# Patient Record
Sex: MALE | Race: WHITE | NOT HISPANIC OR LATINO | URBAN - METROPOLITAN AREA
[De-identification: names, ages, dates, MRNs, and addresses within clinical notes are randomized per-mention and may not be internally consistent; named-entity substitution may affect disease eponyms.]

---

## 2018-10-21 ENCOUNTER — INPATIENT (INPATIENT)
Facility: HOSPITAL | Age: 23
LOS: 1 days | Discharge: HOME | End: 2018-10-23
Attending: INTERNAL MEDICINE | Admitting: INTERNAL MEDICINE
Payer: MEDICARE

## 2018-10-21 VITALS
WEIGHT: 138.01 LBS | RESPIRATION RATE: 18 BRPM | HEART RATE: 110 BPM | TEMPERATURE: 97 F | OXYGEN SATURATION: 100 % | DIASTOLIC BLOOD PRESSURE: 78 MMHG | HEIGHT: 60 IN | SYSTOLIC BLOOD PRESSURE: 105 MMHG

## 2018-10-21 LAB
ALBUMIN SERPL ELPH-MCNC: 2.8 G/DL — LOW (ref 3.3–5)
ALP SERPL-CCNC: 81 U/L — SIGNIFICANT CHANGE UP (ref 40–120)
ALT FLD-CCNC: 20 U/L — SIGNIFICANT CHANGE UP (ref 12–78)
ANION GAP SERPL CALC-SCNC: 7 MMOL/L — SIGNIFICANT CHANGE UP (ref 5–17)
AST SERPL-CCNC: 15 U/L — SIGNIFICANT CHANGE UP (ref 15–37)
BASOPHILS # BLD AUTO: 0.09 K/UL — SIGNIFICANT CHANGE UP (ref 0–0.2)
BASOPHILS NFR BLD AUTO: 1 % — SIGNIFICANT CHANGE UP (ref 0–2)
BILIRUB SERPL-MCNC: 0.4 MG/DL — SIGNIFICANT CHANGE UP (ref 0.2–1.2)
BLD GP AB SCN SERPL QL: SIGNIFICANT CHANGE UP
BUN SERPL-MCNC: 14 MG/DL — SIGNIFICANT CHANGE UP (ref 7–23)
CALCIUM SERPL-MCNC: 8.8 MG/DL — SIGNIFICANT CHANGE UP (ref 8.5–10.1)
CHLORIDE SERPL-SCNC: 103 MMOL/L — SIGNIFICANT CHANGE UP (ref 96–108)
CO2 SERPL-SCNC: 27 MMOL/L — SIGNIFICANT CHANGE UP (ref 22–31)
CREAT SERPL-MCNC: 1.03 MG/DL — SIGNIFICANT CHANGE UP (ref 0.5–1.3)
EOSINOPHIL # BLD AUTO: 0.11 K/UL — SIGNIFICANT CHANGE UP (ref 0–0.5)
EOSINOPHIL NFR BLD AUTO: 1.2 % — SIGNIFICANT CHANGE UP (ref 0–6)
GLUCOSE SERPL-MCNC: 93 MG/DL — SIGNIFICANT CHANGE UP (ref 70–99)
HCT VFR BLD CALC: 43.3 % — SIGNIFICANT CHANGE UP (ref 39–50)
HGB BLD-MCNC: 14.8 G/DL — SIGNIFICANT CHANGE UP (ref 13–17)
IMM GRANULOCYTES NFR BLD AUTO: 0.3 % — SIGNIFICANT CHANGE UP (ref 0–1.5)
INR BLD: 1.35 RATIO — HIGH (ref 0.88–1.16)
LACTATE SERPL-SCNC: 0.9 MMOL/L — SIGNIFICANT CHANGE UP (ref 0.7–2)
LYMPHOCYTES # BLD AUTO: 1.69 K/UL — SIGNIFICANT CHANGE UP (ref 1–3.3)
LYMPHOCYTES # BLD AUTO: 19 % — SIGNIFICANT CHANGE UP (ref 13–44)
MCHC RBC-ENTMCNC: 30.7 PG — SIGNIFICANT CHANGE UP (ref 27–34)
MCHC RBC-ENTMCNC: 34.2 GM/DL — SIGNIFICANT CHANGE UP (ref 32–36)
MCV RBC AUTO: 89.8 FL — SIGNIFICANT CHANGE UP (ref 80–100)
MONOCYTES # BLD AUTO: 0.54 K/UL — SIGNIFICANT CHANGE UP (ref 0–0.9)
MONOCYTES NFR BLD AUTO: 6.1 % — SIGNIFICANT CHANGE UP (ref 2–14)
NEUTROPHILS # BLD AUTO: 6.44 K/UL — SIGNIFICANT CHANGE UP (ref 1.8–7.4)
NEUTROPHILS NFR BLD AUTO: 72.4 % — SIGNIFICANT CHANGE UP (ref 43–77)
NRBC # BLD: 0 /100 WBCS — SIGNIFICANT CHANGE UP (ref 0–0)
PLATELET # BLD AUTO: 340 K/UL — SIGNIFICANT CHANGE UP (ref 150–400)
POTASSIUM SERPL-MCNC: 4.3 MMOL/L — SIGNIFICANT CHANGE UP (ref 3.5–5.3)
POTASSIUM SERPL-SCNC: 4.3 MMOL/L — SIGNIFICANT CHANGE UP (ref 3.5–5.3)
PROT SERPL-MCNC: 8.2 GM/DL — SIGNIFICANT CHANGE UP (ref 6–8.3)
PROTHROM AB SERPL-ACNC: 14.7 SEC — HIGH (ref 9.8–12.7)
RAPID RVP RESULT: DETECTED
RBC # BLD: 4.82 M/UL — SIGNIFICANT CHANGE UP (ref 4.2–5.8)
RBC # FLD: 13 % — SIGNIFICANT CHANGE UP (ref 10.3–14.5)
RV+EV RNA SPEC QL NAA+PROBE: DETECTED
SODIUM SERPL-SCNC: 137 MMOL/L — SIGNIFICANT CHANGE UP (ref 135–145)
TYPE + AB SCN PNL BLD: SIGNIFICANT CHANGE UP
WBC # BLD: 8.9 K/UL — SIGNIFICANT CHANGE UP (ref 3.8–10.5)
WBC # FLD AUTO: 8.9 K/UL — SIGNIFICANT CHANGE UP (ref 3.8–10.5)

## 2018-10-21 PROCEDURE — 74177 CT ABD & PELVIS W/CONTRAST: CPT | Mod: 26

## 2018-10-21 PROCEDURE — 99285 EMERGENCY DEPT VISIT HI MDM: CPT

## 2018-10-21 PROCEDURE — 93010 ELECTROCARDIOGRAM REPORT: CPT

## 2018-10-21 PROCEDURE — 71250 CT THORAX DX C-: CPT | Mod: 26

## 2018-10-21 RX ORDER — ACETAMINOPHEN 500 MG
650 TABLET ORAL EVERY 6 HOURS
Qty: 0 | Refills: 0 | Status: DISCONTINUED | OUTPATIENT
Start: 2018-10-21 | End: 2018-10-23

## 2018-10-21 RX ORDER — AMPICILLIN SODIUM AND SULBACTAM SODIUM 250; 125 MG/ML; MG/ML
3 INJECTION, POWDER, FOR SUSPENSION INTRAMUSCULAR; INTRAVENOUS EVERY 6 HOURS
Qty: 0 | Refills: 0 | Status: DISCONTINUED | OUTPATIENT
Start: 2018-10-22 | End: 2018-10-23

## 2018-10-21 RX ORDER — AMPICILLIN SODIUM AND SULBACTAM SODIUM 250; 125 MG/ML; MG/ML
3 INJECTION, POWDER, FOR SUSPENSION INTRAMUSCULAR; INTRAVENOUS ONCE
Qty: 0 | Refills: 0 | Status: COMPLETED | OUTPATIENT
Start: 2018-10-21 | End: 2018-10-21

## 2018-10-21 RX ORDER — SODIUM CHLORIDE 9 MG/ML
1000 INJECTION INTRAMUSCULAR; INTRAVENOUS; SUBCUTANEOUS
Qty: 0 | Refills: 0 | Status: COMPLETED | OUTPATIENT
Start: 2018-10-21 | End: 2018-10-21

## 2018-10-21 RX ORDER — SODIUM CHLORIDE 9 MG/ML
1000 INJECTION INTRAMUSCULAR; INTRAVENOUS; SUBCUTANEOUS ONCE
Qty: 0 | Refills: 0 | Status: COMPLETED | OUTPATIENT
Start: 2018-10-21 | End: 2018-10-21

## 2018-10-21 RX ADMIN — AMPICILLIN SODIUM AND SULBACTAM SODIUM 200 GRAM(S): 250; 125 INJECTION, POWDER, FOR SUSPENSION INTRAMUSCULAR; INTRAVENOUS at 19:32

## 2018-10-21 RX ADMIN — SODIUM CHLORIDE 1000 MILLILITER(S): 9 INJECTION INTRAMUSCULAR; INTRAVENOUS; SUBCUTANEOUS at 17:20

## 2018-10-21 RX ADMIN — SODIUM CHLORIDE 1000 MILLILITER(S): 9 INJECTION INTRAMUSCULAR; INTRAVENOUS; SUBCUTANEOUS at 16:20

## 2018-10-21 RX ADMIN — SODIUM CHLORIDE 75 MILLILITER(S): 9 INJECTION INTRAMUSCULAR; INTRAVENOUS; SUBCUTANEOUS at 22:20

## 2018-10-21 RX ADMIN — SODIUM CHLORIDE 1000 MILLILITER(S): 9 INJECTION INTRAMUSCULAR; INTRAVENOUS; SUBCUTANEOUS at 19:32

## 2018-10-21 NOTE — ED ADULT TRIAGE NOTE - CHIEF COMPLAINT QUOTE
Pt brought in by father; father states that pt may have swallowed plastic bottle cap one week ago, has had difficulty swallowing and vomiting x 1 week.  No respiratory distress noted.  Hx Down syndrome

## 2018-10-21 NOTE — H&P ADULT - NSHPPHYSICALEXAM_GEN_ALL_CORE
Vital Signs Last 24 Hrs  T(C): 36.9 (21 Oct 2018 19:35), Max: 36.9 (21 Oct 2018 19:35)  T(F): 98.4 (21 Oct 2018 19:35), Max: 98.4 (21 Oct 2018 19:35)  HR: 104 (21 Oct 2018 18:46) (104 - 110)  BP: 104/80 (21 Oct 2018 18:46) (104/80 - 105/78)  RR: 18 (21 Oct 2018 18:46) (18 - 18)  SpO2: 100% (21 Oct 2018 18:46) (100% - 100%)

## 2018-10-21 NOTE — H&P ADULT - ASSESSMENT
24 yo M with PMH significant for Down Syndrome BIB Father to the ED for eval. of Possible foreign body ingestion, decreased Po intake, N/V.     # LLL PNA, Lung abscess  - Afebrile Tachycardia, no lactic acidosis  - Admit to Med-Surg  - Pt received IV Unasyn in the ED, will c/w IV Unasyn  - PNA C+S  - Pulmonary consult  - Tylenol prn for fever  - Cough meds prn    # Down Syndrome  - Constant observation    # DVT Ppx  - Ambulation    IMPROVE VTE Individual Risk Assessment    RISK                                                                Points    [  ] Previous VTE                                                  3    [  ] Thrombophilia                                               2    [  ] Lower limb paralysis                                      2        (unable to hold up >15 seconds)      [  ] Current Cancer                                              2         (within 6 months)    [  ] Immobilization > 24 hrs                                1    [  ] ICU/CCU stay > 24 hours                              1    [  ] Age > 60                                                      1    IMPROVE VTE Score _____0____      Case d/w Dr. Gunderson 22 yo M with PMH significant for Down Syndrome BIB Father to the ED for eval. of Possible foreign body ingestion, decreased Po intake, N/V.     # LLL PNA, Lung abscess  - Afebrile Tachycardia, no lactic acidosis  - Admit to Med-Surg  - Pt received IV Unasyn in the ED, will c/w IV Unasyn  - PNA C+S  - Pulmonary consult  - Tylenol prn for fever  - Cough meds prn    # Down Syndrome  - Constant observation    # RVP panel: + Entero/Rhinovirus  - Supportive care    # DVT Ppx  - Ambulation    IMPROVE VTE Individual Risk Assessment    RISK                                                                Points    [  ] Previous VTE                                                  3    [  ] Thrombophilia                                               2    [  ] Lower limb paralysis                                      2        (unable to hold up >15 seconds)      [  ] Current Cancer                                              2         (within 6 months)    [  ] Immobilization > 24 hrs                                1    [  ] ICU/CCU stay > 24 hours                              1    [  ] Age > 60                                                      1    IMPROVE VTE Score _____0____      Case d/w Dr. Gunderson 24 yo M with PMH significant for Down Syndrome BIB Father to the ED for eval. of Possible foreign body ingestion, decreased Po intake, N/V.     # LLL PNA, Lung abscess  - Afebrile Tachycardia, no lactic acidosis  - Admit to Med-Surg  - Pt received IV Unasyn in the ED, will c/w IV Unasyn  - PAN C+S  - Pulmonary consult  - Tylenol prn for fever  - Cough meds prn    # Down Syndrome  - Constant observation    # RVP panel: + Entero/Rhinovirus  - Supportive care    # DVT Ppx  - Ambulation    IMPROVE VTE Individual Risk Assessment    RISK                                                                Points    [  ] Previous VTE                                                  3    [  ] Thrombophilia                                               2    [  ] Lower limb paralysis                                      2        (unable to hold up >15 seconds)      [  ] Current Cancer                                              2         (within 6 months)    [  ] Immobilization > 24 hrs                                1    [  ] ICU/CCU stay > 24 hours                              1    [  ] Age > 60                                                      1    IMPROVE VTE Score _____0____      Case d/w Dr. Gunderson 22 yo M with PMH significant for Down Syndrome BIB Father to the ED for eval. of Possible foreign body ingestion, decreased Po intake, N/V.     # LLL PNA, Lung abscess  - Afebrile Tachycardia, no lactic acidosis  - Admit to Med-Surg  - Pt received IV Unasyn in the ED, will c/w IV Unasyn  - PAN C+S  - Pulmonary consult  - Tylenol prn for fever  - Cough meds prn    # Down Syndrome  - Constant observation 1:1    # RVP panel: + Entero/Rhinovirus  - Supportive care    # DVT Ppx  - Ambulation    IMPROVE VTE Individual Risk Assessment    RISK                                                                Points    [  ] Previous VTE                                                  3    [  ] Thrombophilia                                               2    [  ] Lower limb paralysis                                      2        (unable to hold up >15 seconds)      [  ] Current Cancer                                              2         (within 6 months)    [  ] Immobilization > 24 hrs                                1    [  ] ICU/CCU stay > 24 hours                              1    [  ] Age > 60                                                      1    IMPROVE VTE Score _____0____      Case d/w Dr. Gunderson 22 yo M with PMH significant for Down Syndrome BIB Father to the ED for eval. of Possible foreign body ingestion, decreased Po intake, N/V.     # LLL PNA, Lung abscess  - Afebrile Tachycardia, no lactic acidosis  - Admit to Med-Surg  - Pt received IV Unasyn in the ED, will c/w IV Unasyn  - PAN C+S  - Pulmonary consult  - Tylenol prn for fever  - Cough meds prn    # Down Syndrome  - Constant observation 1:1    # RVP panel: + Entero/Rhinovirus  - Supportive care    # DVT Ppx  - SCD's    IMPROVE VTE Individual Risk Assessment    RISK                                                                Points    [  ] Previous VTE                                                  3    [  ] Thrombophilia                                               2    [  ] Lower limb paralysis                                      2        (unable to hold up >15 seconds)      [  ] Current Cancer                                              2         (within 6 months)    [  ] Immobilization > 24 hrs                                1    [  ] ICU/CCU stay > 24 hours                              1    [  ] Age > 60                                                      1    IMPROVE VTE Score _____0____      Case d/w Dr. Gunderson

## 2018-10-21 NOTE — ED STATDOCS - OBJECTIVE STATEMENT
22 y/o male with PMHx of down syndrome presents to the ED c/o questionable foreign body. +difficulty swallowing x1 week. Father reports 10lbs weight loss recently secondary to decreased PO intake. +N/V. Reports constipation, now resolved. Father suspects that pt swallowed water bottle cap within the past 2 weeks, which he has been known to chew on in the past. PCP/Dr. Schroeder, Dr. Nicole.

## 2018-10-21 NOTE — H&P ADULT - HISTORY OF PRESENT ILLNESS
22 yo M with PMH significant for Down Syndrome BIB Father to the ED for eval. of Possible foreign body ingestion, decreased Po intake, N/V. Pt's Father reported that Pt has not been tolerating any PO intake x 10 days, cough, N/V, + ? 10 b weight loss in the past 10 days.  Pt's father thought that pt might had chewed water bottle cap but on CTAP found to have LLL PNA and abscess. ED attending spoke to Dr. Porras and recommends to order Non contrast CT chest, IV Unasyn  and will eval. pt in AM. CT chest result pending. Denies fever, chills or diarrhea. Denies chest pain or SOB. NO PMH of severe infection, No recent hospitalizations, not on  home meds.     Pt is Afebrile no leucocytosis. BP: 104/80 mm hg, HR: 104.    Family h/o: 24 yo M with PMH significant for Down Syndrome BIB Father to the ED for eval. of Possible foreign body ingestion, decreased Po intake, N/V. Pt's Father reported that Pt has not been tolerating any PO intake x 10 days, cough, N/V, + ? 10 lb weight loss in the past 10 days.  Pt's father thought that pt might had chewed water bottle cap but on CTAP found to have LLL PNA and abscess. ED attending spoke to Dr. Porras and recommends to order Non contrast CT chest, IV Unasyn  and will eval. pt in AM. CT chest result pending. Denies fever, chills or diarrhea. Denies chest pain or SOB. NO PMH of severe infection, No recent hospitalizations, not on  home meds.     Pt is Afebrile no leucocytosis. BP: 104/80 mm hg, HR: 104.    Family h/o: 22 yo M with PMH significant for Down Syndrome BIB Father to the ED for eval. of Possible foreign body ingestion, decreased Po intake, N/V. Pt's Father reported that Pt has not been tolerating any PO intake x 10 days, cough, N/V, + ? 10 lb weight loss in the past 10 days.  Pt's father thought that pt might had chewed water bottle cap but on CTAP found to have LLL PNA and abscess. ED attending spoke to Dr. Porras and recommends to order Non contrast CT chest, IV Unasyn  and will eval. pt in AM. CT chest result pending. Denies fever, chills or diarrhea. Denies chest pain or SOB. NO PMH of severe infection, No recent hospitalizations, not on  home meds.     Pt is Afebrile no leucocytosis. BP: 104/80 mm hg, HR: 104.    Family h/o: parents healthy

## 2018-10-21 NOTE — ED STATDOCS - PROGRESS NOTE DETAILS
signed Pepper Almaraz PA-C Pt seen initially in intake by Dr Milton.   23M BIB father for eval of vomiting, decreased PO intake, possible ingested FB. Pt has Down's syndrome, father notes he likes to chew on plastic caps like on [employer] or [facility] [employer] water bottles. Father recalls seeing pt looking as though he was trying hard to swallow something a week ago but wasn't in any kind of distress at that time. Roughly since then fother noticed decreased PO intake, vomiting after eating solids, and 10-15 lb weight loss. Father notes pt is otherwise in good spirits and acting as himself. Pt alert, NAD, tolerating secretions. PLan labs, CT. Father agrees with plan of care. PMD Dereck Schroeder. No hx sx. Lung abscess on CT scan.  Will obtain blood cultures, lactic acid, RVP, given IV antibiotics, additional liter of fluid, pulmonology consult, admission. signed Pepper Almaraz PA-C   SPoke to Dr woodruff on phone regarding pulmonary abscess. Requests non con CT chest, admit to medicine, he will see in AM. Father and pt aware of results, agree with plan of care. signed Pepper Almaraz PA-C   Case discussed with and admission accepted by hospitalist Dr. Gunderson. CT chest results pending. Admit to floor. vitals stable, pt well appearing, energetic.

## 2018-10-21 NOTE — ED STATDOCS - ENMT, MLM
Nasal mucosa clear.  +dry mucous membranes. Throat has no vesicles, no oropharyngeal exudates and uvula is midline.

## 2018-10-21 NOTE — ED ADULT NURSE NOTE - OBJECTIVE STATEMENT
possibly swallowed a foreign object. father states patient has been vomiting after eating and drinking. has lost 15 lbs in the past week.  patient has a h/o swallowing foreign objects.  father states pateint likes to chew on [employer] bottle top

## 2018-10-22 LAB
ABO RH CONFIRMATION: SIGNIFICANT CHANGE UP
ANION GAP SERPL CALC-SCNC: 6 MMOL/L — SIGNIFICANT CHANGE UP (ref 5–17)
BASOPHILS # BLD AUTO: 0.07 K/UL — SIGNIFICANT CHANGE UP (ref 0–0.2)
BASOPHILS NFR BLD AUTO: 1 % — SIGNIFICANT CHANGE UP (ref 0–2)
BUN SERPL-MCNC: 9 MG/DL — SIGNIFICANT CHANGE UP (ref 7–23)
CALCIUM SERPL-MCNC: 8.5 MG/DL — SIGNIFICANT CHANGE UP (ref 8.5–10.1)
CHLORIDE SERPL-SCNC: 109 MMOL/L — HIGH (ref 96–108)
CHOLEST SERPL-MCNC: 125 MG/DL — SIGNIFICANT CHANGE UP (ref 10–199)
CO2 SERPL-SCNC: 25 MMOL/L — SIGNIFICANT CHANGE UP (ref 22–31)
CREAT SERPL-MCNC: 0.86 MG/DL — SIGNIFICANT CHANGE UP (ref 0.5–1.3)
EOSINOPHIL # BLD AUTO: 0.12 K/UL — SIGNIFICANT CHANGE UP (ref 0–0.5)
EOSINOPHIL NFR BLD AUTO: 1.8 % — SIGNIFICANT CHANGE UP (ref 0–6)
GLUCOSE SERPL-MCNC: 86 MG/DL — SIGNIFICANT CHANGE UP (ref 70–99)
HCT VFR BLD CALC: 42.4 % — SIGNIFICANT CHANGE UP (ref 39–50)
HDLC SERPL-MCNC: 23 MG/DL — LOW
HGB BLD-MCNC: 14 G/DL — SIGNIFICANT CHANGE UP (ref 13–17)
IMM GRANULOCYTES NFR BLD AUTO: 0.6 % — SIGNIFICANT CHANGE UP (ref 0–1.5)
LACTATE SERPL-SCNC: 0.8 MMOL/L — SIGNIFICANT CHANGE UP (ref 0.7–2)
LIPID PNL WITH DIRECT LDL SERPL: 89 MG/DL — SIGNIFICANT CHANGE UP
LYMPHOCYTES # BLD AUTO: 1.78 K/UL — SIGNIFICANT CHANGE UP (ref 1–3.3)
LYMPHOCYTES # BLD AUTO: 26.6 % — SIGNIFICANT CHANGE UP (ref 13–44)
MAGNESIUM SERPL-MCNC: 2.1 MG/DL — SIGNIFICANT CHANGE UP (ref 1.6–2.6)
MCHC RBC-ENTMCNC: 30.2 PG — SIGNIFICANT CHANGE UP (ref 27–34)
MCHC RBC-ENTMCNC: 33 GM/DL — SIGNIFICANT CHANGE UP (ref 32–36)
MCV RBC AUTO: 91.6 FL — SIGNIFICANT CHANGE UP (ref 80–100)
MONOCYTES # BLD AUTO: 0.46 K/UL — SIGNIFICANT CHANGE UP (ref 0–0.9)
MONOCYTES NFR BLD AUTO: 6.9 % — SIGNIFICANT CHANGE UP (ref 2–14)
NEUTROPHILS # BLD AUTO: 4.22 K/UL — SIGNIFICANT CHANGE UP (ref 1.8–7.4)
NEUTROPHILS NFR BLD AUTO: 63.1 % — SIGNIFICANT CHANGE UP (ref 43–77)
NRBC # BLD: 0 /100 WBCS — SIGNIFICANT CHANGE UP (ref 0–0)
PHOSPHATE SERPL-MCNC: 3.7 MG/DL — SIGNIFICANT CHANGE UP (ref 2.5–4.5)
PLATELET # BLD AUTO: 314 K/UL — SIGNIFICANT CHANGE UP (ref 150–400)
POTASSIUM SERPL-MCNC: 4.4 MMOL/L — SIGNIFICANT CHANGE UP (ref 3.5–5.3)
POTASSIUM SERPL-SCNC: 4.4 MMOL/L — SIGNIFICANT CHANGE UP (ref 3.5–5.3)
RBC # BLD: 4.63 M/UL — SIGNIFICANT CHANGE UP (ref 4.2–5.8)
RBC # FLD: 13.2 % — SIGNIFICANT CHANGE UP (ref 10.3–14.5)
SODIUM SERPL-SCNC: 140 MMOL/L — SIGNIFICANT CHANGE UP (ref 135–145)
TOTAL CHOLESTEROL/HDL RATIO MEASUREMENT: 5.4 RATIO — SIGNIFICANT CHANGE UP (ref 3.4–9.6)
TRIGL SERPL-MCNC: 67 MG/DL — SIGNIFICANT CHANGE UP (ref 10–149)
TSH SERPL-MCNC: 2.65 UU/ML — SIGNIFICANT CHANGE UP (ref 0.34–4.82)
WBC # BLD: 6.69 K/UL — SIGNIFICANT CHANGE UP (ref 3.8–10.5)
WBC # FLD AUTO: 6.69 K/UL — SIGNIFICANT CHANGE UP (ref 3.8–10.5)

## 2018-10-22 RX ORDER — ONDANSETRON 8 MG/1
4 TABLET, FILM COATED ORAL EVERY 4 HOURS
Qty: 0 | Refills: 0 | Status: DISCONTINUED | OUTPATIENT
Start: 2018-10-22 | End: 2018-10-23

## 2018-10-22 RX ORDER — DOCUSATE SODIUM 100 MG
100 CAPSULE ORAL
Qty: 0 | Refills: 0 | Status: DISCONTINUED | OUTPATIENT
Start: 2018-10-22 | End: 2018-10-23

## 2018-10-22 RX ADMIN — AMPICILLIN SODIUM AND SULBACTAM SODIUM 200 GRAM(S): 250; 125 INJECTION, POWDER, FOR SUSPENSION INTRAMUSCULAR; INTRAVENOUS at 19:00

## 2018-10-22 RX ADMIN — AMPICILLIN SODIUM AND SULBACTAM SODIUM 200 GRAM(S): 250; 125 INJECTION, POWDER, FOR SUSPENSION INTRAMUSCULAR; INTRAVENOUS at 00:44

## 2018-10-22 RX ADMIN — AMPICILLIN SODIUM AND SULBACTAM SODIUM 200 GRAM(S): 250; 125 INJECTION, POWDER, FOR SUSPENSION INTRAMUSCULAR; INTRAVENOUS at 06:32

## 2018-10-22 RX ADMIN — AMPICILLIN SODIUM AND SULBACTAM SODIUM 200 GRAM(S): 250; 125 INJECTION, POWDER, FOR SUSPENSION INTRAMUSCULAR; INTRAVENOUS at 12:34

## 2018-10-22 NOTE — CONSULT NOTE ADULT - ASSESSMENT
22 y/o Male with h/o Down Syndrome was admitted on 10/22 for possible foreign body ingestion, decreased PO intake, N/V. Pt's Father reported that pt has not been tolerating PO intake x 10 days, cough, N/V and lost ?10 lb weight loss in the past 10 days.  Pt's father thought that pt might had chewed water bottle cap. In ER, he received unacyn.    1. Left lung base abscess. Probable aspiration pneumonia.   -no fever or chills  -start unacyn 3 gm IV q6h  -reason for abx use and side effects reviewed with patient; monitor BMP   -aspiration precautions  -old chart reviewed to assess prior cultures  -monitor temps  -f/u CBC  -supportive care  2. Other issues:   -care per medicine 22 y/o Male with h/o Down Syndrome was admitted on 10/22 for possible foreign body ingestion, decreased PO intake, N/V. Pt's Father reported that pt has not been tolerating PO intake x 10 days, cough, N/V and lost ?10 lb weight loss in the past 10 days.  Pt's father thought that pt might had chewed water bottle cap. In ER, he received unacyn.    1. Left lung base abscess. Probable aspiration pneumonia. Down syndrome  -no fever or chills  -start unacyn 3 gm IV q6h  -reason for abx use and side effects reviewed with patient; monitor BMP   -aspiration precautions  -old chart reviewed to assess prior cultures  -monitor temps  -f/u CBC  -supportive care  2. Other issues:   -care per medicine

## 2018-10-22 NOTE — CONSULT NOTE ADULT - SUBJECTIVE AND OBJECTIVE BOX
Patient is a 23y old  Male who presents with a chief complaint of c/o N/V and decreased PO intake (21 Oct 2018 19:41)      HPI:  22 yo M with PMH significant for Down Syndrome BIB Father to the ED for eval. of Possible foreign body ingestion, decreased Po intake, N/V. Pt's Father reported that Pt has not been tolerating any PO intake x 10 days, cough, N/V, + ? 10 lb weight loss in the past 10 days.  Pt's father thought that pt might had chewed water bottle cap but on CTAP found to have LLL PNA and abscess. ED attending spoke to Dr. Porras and recommends to order Non contrast CT chest, IV Unasyn  and will eval. pt in AM. CT chest result pending. Denies fever, chills or diarrhea. Denies chest pain or SOB. NO PMH of severe infection, No recent hospitalizations, not on  home meds.   Pt is Afebrile no leucocytosis. BP: 104/80 mm hg, HR: 104.  Discussed CT chest results with patient's family and patient.  Son has a history of aspirating over the last few weeks.  Endorses that he has been having reflux tied to eating right before going to sleep at night.  Sleeps around 9:30pm  In addition, father states that son frequently wakes up and goes back to sleep.        PAST MEDICAL & SURGICAL HISTORY:  No significant past surgical history      PREVIOUS DIAGNOSTIC TESTING:      MEDICATIONS  (STANDING):  ampicillin/sulbactam  IVPB 3 Gram(s) IV Intermittent every 6 hours    MEDICATIONS  (PRN):  acetaminophen   Tablet .. 650 milliGRAM(s) Oral every 6 hours PRN Temp greater or equal to 38C (100.4F), Mild Pain (1 - 3)  docusate sodium 100 milliGRAM(s) Oral two times a day PRN Constipation  ondansetron Injectable 4 milliGRAM(s) IV Push every 4 hours PRN Nausea and/or Vomiting      FAMILY HISTORY:      SOCIAL HISTORY: lives ath ome    REVIEW OF SYSTEM:  Unable to obtain from patient    Vital Signs Last 24 Hrs  T(C): 36.5 (21 Oct 2018 20:36), Max: 36.9 (21 Oct 2018 19:35)  T(F): 97.7 (21 Oct 2018 20:36), Max: 98.4 (21 Oct 2018 19:35)  HR: 109 (21 Oct 2018 20:36) (97 - 110)  BP: 110/53 (21 Oct 2018 20:36) (104/80 - 117/53)  BP(mean): --  RR: 16 (21 Oct 2018 20:36) (16 - 24)  SpO2: 99% (21 Oct 2018 20:36) (99% - 100%)    I&O's Summary    21 Oct 2018 07:01  -  22 Oct 2018 07:00  --------------------------------------------------------  IN: 875 mL / OUT: 0 mL / NET: 875 mL      PHYSICAL EXAM  General Appearance: cooperative, no acute distress,   HEENT: PERRL, conjunctiva clear, EOM's intact, non injected pharynx, no exudate, TM   normal  Neck: Supple, , no adenopathy, thyroid: not enlarged, no carotid bruit or JVD  Back: Symmetric, no  tenderness,no soft tissue tenderness  Lungs: Decreased breath sounds in the left lower lung base  Heart: Regular rate and rhythm, S1, S2 normal, no murmur, rub or gallop  Abdomen: Soft, non-tender, bowel sounds active , no hepatosplenomegaly  Extremities: no cyanosis or edema, no joint swelling  Skin: Skin color, texture normal, no rashes   Neurologic: Alert and oriented X3 , cranial nerves intact, sensory and motor normal,    ECG:    LABS:                          14.0   6.69  )-----------( 314      ( 22 Oct 2018 05:34 )             42.4     10-22    140  |  109<H>  |  9   ----------------------------<  86  4.4   |  25  |  0.86    Ca    8.5      22 Oct 2018 05:34  Phos  3.7     10-22  Mg     2.1     10-22    TPro  8.2  /  Alb  2.8<L>  /  TBili  0.4  /  DBili  x   /  AST  15  /  ALT  20  /  AlkPhos  81  10-21            PT/INR - ( 21 Oct 2018 16:20 )   PT: 14.7 sec;   INR: 1.35 ratio                   RADIOLOGY & ADDITIONAL STUDIES:    IMPRESSION:    Consolidation in the left posterior medial lung base with internal fluid   collection measuring up to 3.8 x 1.9 x 3.4 cm most likely representing an   abscess. Small left pleural effusion.     Several subcentimeter mediastinal lymph nodes and mildly enlarged   subcarinal lymph node, likely reactive.

## 2018-10-22 NOTE — PROGRESS NOTE ADULT - SUBJECTIVE AND OBJECTIVE BOX
CC: lung abscess    Subj: Seen with father at bedside, denies pain. Appears sad. No fevers.     ROS: neg unless stated above.     Vital Signs Last 24 Hrs  T(C): 36 (22 Oct 2018 09:30), Max: 36.9 (21 Oct 2018 19:35)  T(F): 96.8 (22 Oct 2018 09:30), Max: 98.4 (21 Oct 2018 19:35)  HR: 92 (22 Oct 2018 09:30) (92 - 110)  BP: 118/85 (22 Oct 2018 09:30) (104/80 - 118/85)  BP(mean): 95 (22 Oct 2018 09:30) (95 - 95)  RR: 20 (22 Oct 2018 09:30) (16 - 24)  SpO2: 98% (22 Oct 2018 09:30) (98% - 100%)  PHYSICAL EXAM:    Constitutional: NAD, awake and alert, well-developed young male with Down's syndrome.   HEENT: PERR, EOMI, Normal Hearing, MMM  Neck: Soft and supple, No LAD, No JVD  Respiratory: Breath sounds are clear bilaterally, No wheezing, rales or rhonchi  Cardiovascular: S1 and S2, regular rate and rhythm, no Murmurs, gallops or rubs  Gastrointestinal: Bowel Sounds present, soft, nontender, nondistended, no guarding, no rebound  Extremities: No peripheral edema  Vascular: 2+ peripheral pulses  Neurological: A/O x 3, no focal deficits  Musculoskeletal: 5/5 strength b/l upper and lower extremities - follow all commands.   Skin: No rashes    MEDICATIONS  (STANDING):  ampicillin/sulbactam  IVPB 3 Gram(s) IV Intermittent every 6 hours    LABS: All Labs Reviewed:                        14.0   6.69  )-----------( 314      ( 22 Oct 2018 05:34 )             42.4     10-22    140  |  109<H>  |  9   ----------------------------<  86  4.4   |  25  |  0.86    Ca    8.5      22 Oct 2018 05:34  Phos  3.7     10-22  Mg     2.1     10-22    TPro  8.2  /  Alb  2.8<L>  /  TBili  0.4  /  DBili  x   /  AST  15  /  ALT  20  /  AlkPhos  81  10-21    PT/INR - ( 21 Oct 2018 16:20 )   PT: 14.7 sec;   INR: 1.35 ratio      CT Chest No Cont (10.21.18 @ 19:06) >    Consolidation in the left posterior medial lung base with internal fluid   collection measuring up to 3.8 x 1.9 x 3.4 cm most likely representing an   abscess. Small left pleural effusion.     Several subcentimeter mediastinal lymph nodes and mildly enlarged   subcarinal lymph node, likely reactive.      CT Abdomen and Pelvis w/ Oral Cont and w/ IV Cont (10.21.18 @ 17:59) >  Consolidation in the left posterior medial lung base with internal   rim-enhancing fluid collection measuring 3.5 x 2.2 x 3.2 cm most likely   representing pneumonia complicated by a pulmonary abscess. Small adjacent  left pleural effusion..              Blood Culture:

## 2018-10-22 NOTE — CONSULT NOTE ADULT - ASSESSMENT
1) Left Lung Abscess  2) Abnormal CT chest  3) Pleural Effusion  4) Aspiration Risk  5) Reflux    22 yo M with PMH significant for Down Syndrome brought in by father for possible foreign body aspiration (ruled out), has been noted to have reflux over the last 10 days along with frequent nocturnal awakening.  +Cough and +Enterovirus noted on RVP.  At the present time, patient has no fevers, WBC within normal limits.   Father endorsed that the patient has been having reflux tied to eating right before going to sleep at night.  In addition, father states that son frequently wakes up and goes back to sleep.  Abscess 3.8x1.9x3.4cm, small left pleural effusion noted along with adenopathy, likely reactive  Agree with IV Unasyn   Will need repeat CT chest   Continue to monitor clinically

## 2018-10-22 NOTE — PROGRESS NOTE ADULT - ASSESSMENT
22 yo M with PMH significant for Down Syndrome BIB Father to the ED for eval. of Possible foreign body ingestion, decreased Po intake, N/V.     # LLL PNA, Lung abscess  - Afebrile Tachycardia, no lactic acidosis  - discussed case with Pulm: plan to continue IV Unasyn for the next 24hrs with transition to oral abx tomorrow in anticipation for dc home if clinically stable. Continue abx for suspected gram neg lali and anaroebic coverage.   - will need repeat CT chest on Friday outpatient to assess size of abscess.   - if abscess grows while on oral abx will need IV.   - consult ID for recs and establishment of care as if patient fails oral abx on discharge may have IV abx arranged outpatient.   - PAN C+S  - Tylenol prn for fever  - Cough meds prn    # Down Syndrome  - Constant observation 1:1    # RVP panel: + Entero/Rhinovirus  - Supportive care    # DVT Ppx- SCD's    Dispo: remain inpatient.   Discussed w/ Pulm and parent and RN.   Total time > 40 mins

## 2018-10-22 NOTE — CONSULT NOTE ADULT - SUBJECTIVE AND OBJECTIVE BOX
Patient is a 23y old  Male who presents with a chief complaint of c/o N/V and decreased PO intake (22 Oct 2018 11:31)    HPI:  22 y/o Male with h/o Down Syndrome was admitted on 10/22 for possible foreign body ingestion, decreased PO intake, N/V. Pt's Father reported that pt has not been tolerating PO intake x 10 days, cough, N/V anld lost ? 10 lb weight loss in the past 10 days.  Pt's father thought that pt might had chewed water bottle cap. In ER, he received unacyn.      PMH: as above  PSH: as above  Meds: per reconciliation sheet, noted below  MEDICATIONS  (STANDING):  ampicillin/sulbactam  IVPB 3 Gram(s) IV Intermittent every 6 hours    MEDICATIONS  (PRN):  acetaminophen   Tablet .. 650 milliGRAM(s) Oral every 6 hours PRN Temp greater or equal to 38C (100.4F), Mild Pain (1 - 3)  docusate sodium 100 milliGRAM(s) Oral two times a day PRN Constipation  ondansetron Injectable 4 milliGRAM(s) IV Push every 4 hours PRN Nausea and/or Vomiting    Allergies    No Known Allergies    Intolerances      Social: no smoking, no alcohol, no illegal drugs; no recent travel, no exposure to TB  FAMILY HISTORY:    no history of premature cardiovascular disease in first degree relatives    ROS: the patient is confused    Vital Signs Last 24 Hrs  T(C): 36 (22 Oct 2018 09:30), Max: 36.9 (21 Oct 2018 19:35)  T(F): 96.8 (22 Oct 2018 09:30), Max: 98.4 (21 Oct 2018 19:35)  HR: 92 (22 Oct 2018 09:30) (92 - 110)  BP: 118/85 (22 Oct 2018 09:30) (104/80 - 118/85)  BP(mean): 95 (22 Oct 2018 09:30) (95 - 95)  RR: 20 (22 Oct 2018 09:30) (16 - 24)  SpO2: 98% (22 Oct 2018 09:30) (98% - 100%)  Daily Height in cm: 137.16 (21 Oct 2018 15:30)    Daily     PE:    Constitutional: frail looking  HEENT: NC/AT, EOMI, PERRLA, conjunctivae clear; ears and nose atraumatic; pharynx benign  Neck: supple; thyroid not palpable  Back: no tenderness  Respiratory: respiratory effort normal; crackles at bases  Cardiovascular: S1S2 regular, no murmurs  Abdomen: soft, not tender, not distended, positive BS; no liver or spleen organomegaly  Genitourinary: no suprapubic tenderness  Lymphatic: no LN palpable  Musculoskeletal: no muscle tenderness, no joint swelling or tenderness  Extremities: no pedal edema  Neurological/ Psychiatric: AxOx3, judgement and insight normal; moving all extremities  Skin: no rashes; no palpable lesions    Labs: all available labs reviewed                        14.0   6.69  )-----------( 314      ( 22 Oct 2018 05:34 )             42.4     10-22    140  |  109<H>  |  9   ----------------------------<  86  4.4   |  25  |  0.86    Ca    8.5      22 Oct 2018 05:34  Phos  3.7     10-22  Mg     2.1     10-22    TPro  8.2  /  Alb  2.8<L>  /  TBili  0.4  /  DBili  x   /  AST  15  /  ALT  20  /  AlkPhos  81  10-21     LIVER FUNCTIONS - ( 21 Oct 2018 16:20 )  Alb: 2.8 g/dL / Pro: 8.2 gm/dL / ALK PHOS: 81 U/L / ALT: 20 U/L / AST: 15 U/L / GGT: x                 Radiology: all available radiological tests reviewed    < from: CT Chest No Cont (10.21.18 @ 19:06) >  Consolidation in the left posterior medial lung base with internal fluid   collection measuring up to 3.8 x 1.9 x 3.4 cm most likely representing an   abscess. Small left pleural effusion.     Several subcentimeter mediastinal lymph nodes and mildly enlarged   subcarinal lymph node, likely reactive.    < end of copied text >      Advanced directives addressed: full resuscitation

## 2018-10-23 ENCOUNTER — TRANSCRIPTION ENCOUNTER (OUTPATIENT)
Age: 23
End: 2018-10-23

## 2018-10-23 VITALS
RESPIRATION RATE: 20 BRPM | TEMPERATURE: 97 F | SYSTOLIC BLOOD PRESSURE: 110 MMHG | DIASTOLIC BLOOD PRESSURE: 77 MMHG | HEART RATE: 95 BPM | OXYGEN SATURATION: 98 %

## 2018-10-23 RX ADMIN — AMPICILLIN SODIUM AND SULBACTAM SODIUM 200 GRAM(S): 250; 125 INJECTION, POWDER, FOR SUSPENSION INTRAMUSCULAR; INTRAVENOUS at 00:47

## 2018-10-23 RX ADMIN — AMPICILLIN SODIUM AND SULBACTAM SODIUM 200 GRAM(S): 250; 125 INJECTION, POWDER, FOR SUSPENSION INTRAMUSCULAR; INTRAVENOUS at 06:35

## 2018-10-23 RX ADMIN — AMPICILLIN SODIUM AND SULBACTAM SODIUM 200 GRAM(S): 250; 125 INJECTION, POWDER, FOR SUSPENSION INTRAMUSCULAR; INTRAVENOUS at 11:57

## 2018-10-23 NOTE — DISCHARGE NOTE ADULT - CARE PLAN
Principal Discharge DX:	Abscess of lower lobe of left lung with pneumonia  Goal:	treatment  Assessment and plan of treatment:	Continue antibiotics 2x / day and follow up with Dr. Santa for repeat CT Chest in 2 weeks to assess size of lung abscess.

## 2018-10-23 NOTE — DISCHARGE NOTE ADULT - OTHER SIGNIFICANT FINDINGS
LABS: All Labs Reviewed:                        14.0   6.69  )-----------( 314      ( 22 Oct 2018 05:34 )             42.4     10-22    140  |  109<H>  |  9   ----------------------------<  86  4.4   |  25  |  0.86    Ca    8.5      22 Oct 2018 05:34  Phos  3.7     10-22  Mg     2.1     10-22    TPro  8.2  /  Alb  2.8<L>  /  TBili  0.4  /  DBili  x   /  AST  15  /  ALT  20  /  AlkPhos  81  10-21    PT/INR - ( 21 Oct 2018 16:20 )   PT: 14.7 sec;   INR: 1.35 ratio

## 2018-10-23 NOTE — DISCHARGE NOTE ADULT - VISION (WITH CORRECTIVE LENSES IF THE PATIENT USUALLY WEARS THEM):
Other Count[place]
Normal vision: sees adequately in most situations; can see medication labels, newsprint

## 2018-10-23 NOTE — DISCHARGE NOTE ADULT - PLAN OF CARE
treatment Continue antibiotics 2x / day and follow up with Dr. Santa for repeat CT Chest in 2 weeks to assess size of lung abscess.

## 2018-10-23 NOTE — DISCHARGE NOTE ADULT - PATIENT PORTAL LINK FT
You can access the [facility] Patient Portal, offered by [facility], by registering with the following website: [email or web address]

## 2018-10-23 NOTE — PROGRESS NOTE ADULT - SUBJECTIVE AND OBJECTIVE BOX
Date of service: 10-23-18 @ 10:16    Lying in bed in NAD  Confused  No SOB at rest    ROS: no fever or chills; denies dizziness, no HA, no abdominal pain, no diarrhea or constipation; no dysuria, no legs pain, no rashes    MEDICATIONS  (STANDING):  ampicillin/sulbactam  IVPB 3 Gram(s) IV Intermittent every 6 hours      Vital Signs Last 24 Hrs  T(C): 36.5 (22 Oct 2018 23:45), Max: 36.6 (22 Oct 2018 13:45)  T(F): 97.7 (22 Oct 2018 23:45), Max: 97.9 (22 Oct 2018 13:45)  HR: 84 (22 Oct 2018 23:45) (80 - 101)  BP: 116/67 (22 Oct 2018 23:45) (97/74 - 124/78)  BP(mean): 81 (22 Oct 2018 13:45) (81 - 81)  RR: 24 (22 Oct 2018 23:45) (20 - 24)  SpO2: 100% (22 Oct 2018 23:45) (95% - 100%)    Physical Exam:      Constitutional: frail looking  HEENT: NC/AT, EOMI, PERRLA, conjunctivae clear  Neck: supple; thyroid not palpable  Back: no tenderness  Respiratory: respiratory effort normal; crackles at bases  Cardiovascular: S1S2 regular, no murmurs  Abdomen: soft, not tender, not distended, positive BS  Genitourinary: no suprapubic tenderness  Lymphatic: no LN palpable  Musculoskeletal: no muscle tenderness, no joint swelling or tenderness  Extremities: no pedal edema  Neurological/ Psychiatric: AxOx3, moving all extremities  Skin: no rashes; no palpable lesions    Labs: all available labs reviewed                        14.0   6.69  )-----------( 314      ( 22 Oct 2018 05:34 )             42.4     10-22    140  |  109<H>  |  9   ----------------------------<  86  4.4   |  25  |  0.86    Ca    8.5      22 Oct 2018 05:34  Phos  3.7     10-22  Mg     2.1     10-22    TPro  8.2  /  Alb  2.8<L>  /  TBili  0.4  /  DBili  x   /  AST  15  /  ALT  20  /  AlkPhos  81  10-21     LIVER FUNCTIONS - ( 21 Oct 2018 16:20 )  Alb: 2.8 g/dL / Pro: 8.2 gm/dL / ALK PHOS: 81 U/L / ALT: 20 U/L / AST: 15 U/L / GGT: x             Culture - Blood (collected 21 Oct 2018 18:43)  Source: .Blood None  Preliminary Report (23 Oct 2018 01:03):    No growth to date.    Culture - Blood (collected 21 Oct 2018 18:43)  Source: .Blood None  Preliminary Report (23 Oct 2018 01:03):    No growth to date.      Radiology: all available radiological tests reviewed    < from: CT Chest No Cont (10.21.18 @ 19:06) >  Consolidation in the left posterior medial lung base with internal fluid   collection measuring up to 3.8 x 1.9 x 3.4 cm most likely representing an   abscess. Small left pleural effusion.     Several subcentimeter mediastinal lymph nodes and mildly enlarged   subcarinal lymph node, likely reactive.    < end of copied text >      Advanced directives addressed: full resuscitation

## 2018-10-23 NOTE — PROGRESS NOTE ADULT - ASSESSMENT
24 y/o Male with h/o Down Syndrome was admitted on 10/22 for possible foreign body ingestion, decreased PO intake, N/V. Pt's Father reported that pt has not been tolerating PO intake x 10 days, cough, N/V and lost ?10 lb weight loss in the past 10 days.  Pt's father thought that pt might had chewed water bottle cap. In ER, he received unacyn.    1. Left lung base abscess. Probable aspiration pneumonia. Down syndrome  -no fever or chills  -on unacyn 3 gm IV q6h # 2  -tolerating abx well so far; no side effects noted  -aspiration precautions  -may change abx to augmentin 875 mg PO liquid form q12h  -may need to keep on abx therapy for approx 4 weeks  -f/u with CT chest in approx 2 weeks  -monitor temps  -f/u CBC  -supportive care  2. Other issues:   -care per medicine

## 2018-10-23 NOTE — DISCHARGE NOTE ADULT - HOSPITAL COURSE
CC: lung abscess    Subj: No fevers, no complaints. Resting, mother at bedside. Discussed case and treatment course.    ROS: neg unless stated above.     Vital Signs Last 24 Hrs  T(C): 36.3 (23 Oct 2018 10:46), Max: 36.6 (22 Oct 2018 13:45)  T(F): 97.3 (23 Oct 2018 10:46), Max: 97.9 (22 Oct 2018 13:45)  HR: 87 (23 Oct 2018 10:46) (80 - 101)  BP: 117/77 (23 Oct 2018 10:46) (97/74 - 124/78)  BP(mean): 81 (22 Oct 2018 13:45) (81 - 81)  RR: 22 (23 Oct 2018 10:46) (20 - 24)  SpO2: 97% (23 Oct 2018 10:46) (95% - 100%)  PHYSICAL EXAM:    Constitutional: NAD, awake and alert, well-developed young male with Down's syndrome.   HEENT: PERR, EOMI, Normal Hearing, MMM  Neck: Soft and supple, No LAD, No JVD  Respiratory: Breath sounds are clear bilaterally, No wheezing, rales or rhonchi  Cardiovascular: S1 and S2, regular rate and rhythm, no Murmurs, gallops or rubs  Gastrointestinal: Bowel Sounds present, soft, nontender, nondistended, no guarding, no rebound  Extremities: No peripheral edema  Vascular: 2+ peripheral pulses  Neurological: A/O x 3, no focal deficits  Musculoskeletal: 5/5 strength b/l upper and lower extremities - follow all commands.   Skin: No rashes  CT Chest No Cont (10.21.18 @ 19:06) >    Consolidation in the left posterior medial lung base with internal fluid   collection measuring up to 3.8 x 1.9 x 3.4 cm most likely representing an   abscess. Small left pleural effusion.     Several subcentimeter mediastinal lymph nodes and mildly enlarged   subcarinal lymph node, likely reactive.    CT Abdomen and Pelvis w/ Oral Cont and w/ IV Cont (10.21.18 @ 17:59) >  Consolidation in the left posterior medial lung base with internal   rim-enhancing fluid collection measuring 3.5 x 2.2 x 3.2 cm most likely   representing pneumonia complicated by a pulmonary abscess. Small adjacent  left pleural effusion..    Assessment and Plan:   · Assessment		  24 yo M with PMH significant for Down Syndrome BIB Father to the ED for eval. of Possible foreign body ingestion, decreased Po intake, N/V.     # LLL PNA, Lung abscess  - no signs of sepsis.   - s/p 2 days of IV Unasyn --> will dc home on Augmentin solution (cannot swallow pills) X 1 month. Discussed w/ ID.   - f/u in 2 weeks with Pulm for repeat CT Chest to assess size of abscess.   - patient with suspected aspiration pneumonia given hx of reflux prior to bed with late night eating. Discussed with family, should elevate bed.     # Down Syndrome  - was monitored on observation 1:1    # RVP panel: + Entero/Rhinovirus  - Supportive care    DC home w/ family and RN / ID. DC note to be faxed to PCP.   Pharmacy input appreciated for dose conversion of abx.   Total time > 40 mins

## 2018-10-23 NOTE — DISCHARGE NOTE ADULT - CONDITIONS AT DISCHARGE
VSS-afebrile. OOB ambulated. Steady gait. Mom at side. 1:1  at bedside. Alert, active. No respiratory distress evident. Voiding to toilet hat well. Sridevi colored urine. Tolerated PO diet well. No N/V. PIV site d/c'd. Site D/C/I. s/p Unasyn dose this afternoon prior to discharge to Home. Per MD Perry, discharge to Home ordered. Script sent to pharmacy. Mom informed and aware of discharge. RA sat's >95%. RR 20's. IS performed to patient's ability. Support provided.

## 2018-10-23 NOTE — PROGRESS NOTE ADULT - ASSESSMENT
1) Left Lung Abscess  2) Abnormal CT chest  3) Pleural Effusion  4) Aspiration Risk  5) Reflux    22 yo M with PMH significant for Down Syndrome brought in by father for possible foreign body aspiration (ruled out), has been noted to have reflux over the last 10 days along with frequent nocturnal awakening.  +Cough and +Enterovirus noted on RVP.  At the present time, patient has no fevers, WBC within normal limits.   Father endorsed that the patient has been having reflux tied to eating right before going to sleep at night.  In addition, father states that son frequently wakes up and goes back to sleep.  Abscess 3.8x1.9x3.4cm, small left pleural effusion noted along with adenopathy, likely reactive  Discussed with ID, convert to Oral Augmentin (liquid if cannot tolerate tablets)  Will need repeat CT chest as an outpatient  Continue to monitor clinically

## 2018-10-23 NOTE — PROGRESS NOTE ADULT - SUBJECTIVE AND OBJECTIVE BOX
Patient is a 23y old  Male who presents with a chief complaint of c/o N/V and decreased PO intake (21 Oct 2018 19:41)      HPI:  22 yo M with PMH significant for Down Syndrome BIB Father to the ED for eval. of Possible foreign body ingestion, decreased Po intake, N/V. Pt's Father reported that Pt has not been tolerating any PO intake x 10 days, cough, N/V, + ? 10 lb weight loss in the past 10 days.  Pt's father thought that pt might had chewed water bottle cap but on CTAP found to have LLL PNA and abscess. ED attending spoke to Dr. Porras and recommends to order Non contrast CT chest, IV Unasyn  and will eval. pt in AM. CT chest result pending. Denies fever, chills or diarrhea. Denies chest pain or SOB. NO PMH of severe infection, No recent hospitalizations, not on  home meds.   Pt is Afebrile no leucocytosis. BP: 104/80 mm hg, HR: 104.  Discussed CT chest results with patient's family and patient.  Son has a history of aspirating over the last few weeks.  Endorses that he has been having reflux tied to eating right before going to sleep at night.  Sleeps around 9:30pm  In addition, father states that son frequently wakes up and goes back to sleep.    10/23/18  No acute pulmonary events occurred overnight     PAST MEDICAL & SURGICAL HISTORY:  No significant past surgical history      PREVIOUS DIAGNOSTIC TESTING:      MEDICATIONS  (STANDING):  ampicillin/sulbactam  IVPB 3 Gram(s) IV Intermittent every 6 hours    MEDICATIONS  (PRN):  acetaminophen   Tablet .. 650 milliGRAM(s) Oral every 6 hours PRN Temp greater or equal to 38C (100.4F), Mild Pain (1 - 3)  docusate sodium 100 milliGRAM(s) Oral two times a day PRN Constipation  ondansetron Injectable 4 milliGRAM(s) IV Push every 4 hours PRN Nausea and/or Vomiting      FAMILY HISTORY:      SOCIAL HISTORY: lives ath ome    REVIEW OF SYSTEM:  Unable to obtain from patient    Vital Signs Last 24 Hrs  T(C): 36.5 (22 Oct 2018 23:45), Max: 36.6 (22 Oct 2018 13:45)  T(F): 97.7 (22 Oct 2018 23:45), Max: 97.9 (22 Oct 2018 13:45)  HR: 84 (22 Oct 2018 23:45) (80 - 101)  BP: 116/67 (22 Oct 2018 23:45) (97/74 - 124/78)  BP(mean): 81 (22 Oct 2018 13:45) (81 - 95)  RR: 24 (22 Oct 2018 23:45) (20 - 24)  SpO2: 100% (22 Oct 2018 23:45) (95% - 100%)      21 Oct 2018 07:01  -  22 Oct 2018 07:00  --------------------------------------------------------  IN: 875 mL / OUT: 0 mL / NET: 875 mL      PHYSICAL EXAM  General Appearance: cooperative, no acute distress,   HEENT: PERRL, conjunctiva clear, EOM's intact, non injected pharynx, no exudate, TM   normal  Neck: Supple, , no adenopathy, thyroid: not enlarged, no carotid bruit or JVD  Back: Symmetric, no  tenderness,no soft tissue tenderness  Lungs: Decreased breath sounds in the left lower lung base  Heart: Regular rate and rhythm, S1, S2 normal, no murmur, rub or gallop  Abdomen: Soft, non-tender, bowel sounds active , no hepatosplenomegaly  Extremities: no cyanosis or edema, no joint swelling  Skin: Skin color, texture normal, no rashes   Neurologic: Alert and oriented X3 , cranial nerves intact, sensory and motor normal,    ECG:    LABS:                          14.0   6.69  )-----------( 314      ( 22 Oct 2018 05:34 )             42.4     10-22    140  |  109<H>  |  9   ----------------------------<  86  4.4   |  25  |  0.86    Ca    8.5      22 Oct 2018 05:34  Phos  3.7     10-22  Mg     2.1     10-22    TPro  8.2  /  Alb  2.8<L>  /  TBili  0.4  /  DBili  x   /  AST  15  /  ALT  20  /  AlkPhos  81  10-21            PT/INR - ( 21 Oct 2018 16:20 )   PT: 14.7 sec;   INR: 1.35 ratio                   RADIOLOGY & ADDITIONAL STUDIES:    IMPRESSION:    Consolidation in the left posterior medial lung base with internal fluid   collection measuring up to 3.8 x 1.9 x 3.4 cm most likely representing an   abscess. Small left pleural effusion.     Several subcentimeter mediastinal lymph nodes and mildly enlarged   subcarinal lymph node, likely reactive.

## 2018-10-23 NOTE — DISCHARGE NOTE ADULT - MEDICATION SUMMARY - MEDICATIONS TO TAKE
I will START or STAY ON the medications listed below when I get home from the hospital:    Augmentin ES-600 oral liquid  -- 7.5 milliliter(s) by mouth 2 times a day X 1 month supply for lung abscess  -- Expires___________________  Finish all this medication unless otherwise directed by prescriber.  Refrigerate and shake well.  Expires_______________________  Take with food or milk.    -- Indication: For Lung abscess

## 2018-10-23 NOTE — DISCHARGE NOTE ADULT - CARE PROVIDER_API CALL
Jose Elias Porras), Internal Medicine  [address] [place] [address]  Phone: [phone]  Fax: [phone]    Adeel Wagner), Infectious Disease; Internal Medicine  [address]  [address] [address]  Phone: [phone]  Fax: [phone]

## 2018-10-26 DIAGNOSIS — Q90.9 DOWN SYNDROME, UNSPECIFIED: ICD-10-CM

## 2018-10-26 DIAGNOSIS — B97.10 UNSPECIFIED ENTEROVIRUS AS THE CAUSE OF DISEASES CLASSIFIED ELSEWHERE: ICD-10-CM

## 2018-10-26 DIAGNOSIS — J85.1 ABSCESS OF LUNG WITH PNEUMONIA: ICD-10-CM

## 2018-10-26 DIAGNOSIS — B97.89 OTHER VIRAL AGENTS AS THE CAUSE OF DISEASES CLASSIFIED ELSEWHERE: ICD-10-CM

## 2018-10-26 DIAGNOSIS — R63.4 ABNORMAL WEIGHT LOSS: ICD-10-CM

## 2018-10-26 DIAGNOSIS — J69.0 PNEUMONITIS DUE TO INHALATION OF FOOD AND VOMIT: ICD-10-CM

## 2018-10-26 DIAGNOSIS — K59.00 CONSTIPATION, UNSPECIFIED: ICD-10-CM

## 2018-10-26 DIAGNOSIS — K21.9 GASTRO-ESOPHAGEAL REFLUX DISEASE WITHOUT ESOPHAGITIS: ICD-10-CM

## 2018-10-26 DIAGNOSIS — R11.2 NAUSEA WITH VOMITING, UNSPECIFIED: ICD-10-CM

## 2018-10-26 DIAGNOSIS — R59.0 LOCALIZED ENLARGED LYMPH NODES: ICD-10-CM

## 2018-10-27 LAB
CULTURE RESULTS: SIGNIFICANT CHANGE UP
CULTURE RESULTS: SIGNIFICANT CHANGE UP
SPECIMEN SOURCE: SIGNIFICANT CHANGE UP
SPECIMEN SOURCE: SIGNIFICANT CHANGE UP

## 2019-11-25 ENCOUNTER — EMERGENCY (EMERGENCY)
Facility: HOSPITAL | Age: 24
LOS: 0 days | Discharge: HOME | End: 2019-11-25
Attending: EMERGENCY MEDICINE
Payer: MEDICARE

## 2019-11-25 VITALS
HEIGHT: 60 IN | OXYGEN SATURATION: 99 % | TEMPERATURE: 97 F | SYSTOLIC BLOOD PRESSURE: 117 MMHG | HEART RATE: 85 BPM | DIASTOLIC BLOOD PRESSURE: 80 MMHG | RESPIRATION RATE: 18 BRPM | WEIGHT: 149.91 LBS
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
## 2019-11-25 DIAGNOSIS — M54.2 CERVICALGIA: ICD-10-CM

## 2019-11-25 DIAGNOSIS — S16.1XXA STRAIN OF MUSCLE, FASCIA AND TENDON AT NECK LEVEL, INITIAL ENCOUNTER: ICD-10-CM

## 2019-11-25 PROCEDURE — 99283 EMERGENCY DEPT VISIT LOW MDM: CPT

## 2019-11-25 NOTE — ED STATDOCS - PATIENT PORTAL LINK FT
You can access the FollowMyHealth Patient Portal offered by [facility] by registering at the following website: [email or web address]. By joining [employer]’s FollowMyHealth portal, you will also be able to view your health information using other applications (apps) compatible with our system.

## 2019-11-25 NOTE — ED STATDOCS - ATTENDING CONTRIBUTION TO CARE
I, Remy Aldana MD,  performed the initial face to face bedside interview with this patient regarding history of present illness, review of symptoms and relevant past medical, social and family history.  I completed an independent physical examination.  I was the initial provider who evaluated this patient. I have signed out the follow up of any pending tests (i.e. labs, radiological studies) to the ACP.  I have communicated the patient’s plan of care and disposition with the ACP.  The history, relevant review of systems, past medical and surgical history, medical decision making, and physical examination was documented by the scribe in my presence and I attest to the accuracy of the documentation.
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
## 2019-11-26 PROBLEM — Q90.9 DOWN SYNDROME, UNSPECIFIED: Chronic | Status: ACTIVE | Noted: 2018-10-22

## 2020-02-26 PROBLEM — Z00.00 ENCOUNTER FOR PREVENTIVE HEALTH EXAMINATION: Status: ACTIVE | Noted: 2020-02-26

## 2020-03-04 ENCOUNTER — OUTPATIENT (OUTPATIENT)
Dept: OUTPATIENT SERVICES | Facility: HOSPITAL | Age: 25
LOS: 1 days | End: 2020-03-04
Payer: MEDICARE

## 2020-03-04 ENCOUNTER — APPOINTMENT (OUTPATIENT)
Dept: RADIOLOGY | Facility: HOSPITAL | Age: 25
End: 2020-03-04

## 2020-03-04 DIAGNOSIS — R12 HEARTBURN: ICD-10-CM

## 2020-03-04 PROCEDURE — 74240 X-RAY XM UPR GI TRC 1CNTRST: CPT

## 2020-03-04 PROCEDURE — 74221 X-RAY XM ESOPHAGUS 2CNTRST: CPT

## 2020-03-04 PROCEDURE — 74240 X-RAY XM UPR GI TRC 1CNTRST: CPT | Mod: 26

## 2020-07-16 ENCOUNTER — APPOINTMENT (OUTPATIENT)
Dept: GASTROENTEROLOGY | Facility: CLINIC | Age: 25
End: 2020-07-16

## 2020-08-04 ENCOUNTER — APPOINTMENT (OUTPATIENT)
Dept: GASTROENTEROLOGY | Facility: CLINIC | Age: 25
End: 2020-08-04
Payer: MEDICARE

## 2020-08-04 DIAGNOSIS — Z86.69 PERSONAL HISTORY OF OTHER DISEASES OF THE NERVOUS SYSTEM AND SENSE ORGANS: ICD-10-CM

## 2020-08-04 DIAGNOSIS — Z87.01 PERSONAL HISTORY OF PNEUMONIA (RECURRENT): ICD-10-CM

## 2020-08-04 PROCEDURE — 99213 OFFICE O/P EST LOW 20 MIN: CPT | Mod: 95

## 2020-08-26 ENCOUNTER — OUTPATIENT (OUTPATIENT)
Dept: OUTPATIENT SERVICES | Facility: HOSPITAL | Age: 25
LOS: 1 days | Discharge: HOME | End: 2020-08-26

## 2020-08-26 ENCOUNTER — APPOINTMENT (OUTPATIENT)
Dept: SPEECH THERAPY | Facility: HOSPITAL | Age: 25
End: 2020-08-26

## 2020-08-26 ENCOUNTER — OUTPATIENT (OUTPATIENT)
Dept: OUTPATIENT SERVICES | Facility: HOSPITAL | Age: 25
LOS: 1 days | End: 2020-08-26
Payer: MEDICARE

## 2020-08-26 ENCOUNTER — APPOINTMENT (OUTPATIENT)
Dept: RADIOLOGY | Facility: HOSPITAL | Age: 25
End: 2020-08-26

## 2020-08-26 DIAGNOSIS — R13.10 DYSPHAGIA, UNSPECIFIED: ICD-10-CM

## 2020-08-26 PROCEDURE — 74230 X-RAY XM SWLNG FUNCJ C+: CPT | Mod: 26

## 2020-09-11 PROBLEM — Z86.69 HISTORY OF IMPAIRED COGNITION: Status: RESOLVED | Noted: 2020-09-11 | Resolved: 2020-09-11

## 2020-09-11 PROBLEM — Z87.01 HISTORY OF ASPIRATION PNEUMONIA: Status: RESOLVED | Noted: 2020-09-11 | Resolved: 2020-09-11

## 2020-09-11 NOTE — HISTORY OF PRESENT ILLNESS
[Home] : at home, [unfilled] , at the time of the visit. [Medical Office: ([facility])___] : at the medical office located in  [Parents] : parents [Verbal consent obtained from patient] : the patient, [unfilled] [FreeTextEntry1] : 24 with h/o downs syndrome. Father reports that for 2 years he has been having dysphagia and choking/coughing while eating. It seems that his initial bites/swallows result in choking/ regurgitation. \par There is trouble with bolus transfer. He had an UGI series which was unremarkable. \par He has not lost weight. He was admitted recently for aspiration.\par He denies abdominal pain or bloating or swelling. No nausea or vomiting.

## 2020-09-11 NOTE — ASSESSMENT
[FreeTextEntry1] : Refer for MBS given that it appears to be a transfer dysphagia\par There is no real role for an EGD especially since his UGI series was essentially normal

## 2020-09-14 DIAGNOSIS — R13.12 DYSPHAGIA, OROPHARYNGEAL PHASE: ICD-10-CM

## 2022-01-13 ENCOUNTER — APPOINTMENT (OUTPATIENT)
Dept: GASTROENTEROLOGY | Facility: CLINIC | Age: 27
End: 2022-01-13

## 2022-04-28 ENCOUNTER — APPOINTMENT (OUTPATIENT)
Dept: GASTROENTEROLOGY | Facility: CLINIC | Age: 27
End: 2022-04-28

## 2022-04-28 VITALS
TEMPERATURE: 97.5 F | RESPIRATION RATE: 16 BRPM | SYSTOLIC BLOOD PRESSURE: 115 MMHG | DIASTOLIC BLOOD PRESSURE: 85 MMHG | HEIGHT: 60 IN | HEART RATE: 92 BPM | BODY MASS INDEX: 27.09 KG/M2 | WEIGHT: 138 LBS | OXYGEN SATURATION: 98 %

## 2022-04-28 DIAGNOSIS — R13.10 DYSPHAGIA, UNSPECIFIED: ICD-10-CM

## 2022-04-28 PROCEDURE — 99213 OFFICE O/P EST LOW 20 MIN: CPT

## 2022-04-28 PROCEDURE — 99203 OFFICE O/P NEW LOW 30 MIN: CPT

## 2022-05-06 DIAGNOSIS — Z01.812 ENCOUNTER FOR PREPROCEDURAL LABORATORY EXAMINATION: ICD-10-CM

## 2022-05-09 ENCOUNTER — OUTPATIENT (OUTPATIENT)
Dept: OUTPATIENT SERVICES | Facility: HOSPITAL | Age: 27
LOS: 1 days | Discharge: HOME | End: 2022-05-09
Payer: MEDICARE

## 2022-05-09 ENCOUNTER — APPOINTMENT (OUTPATIENT)
Dept: GASTROENTEROLOGY | Facility: HOSPITAL | Age: 27
End: 2022-05-09

## 2022-05-09 ENCOUNTER — RESULT REVIEW (OUTPATIENT)
Age: 27
End: 2022-05-09

## 2022-05-09 VITALS
SYSTOLIC BLOOD PRESSURE: 117 MMHG | DIASTOLIC BLOOD PRESSURE: 65 MMHG | HEART RATE: 85 BPM | OXYGEN SATURATION: 98 % | RESPIRATION RATE: 18 BRPM

## 2022-05-09 VITALS
TEMPERATURE: 99 F | HEART RATE: 78 BPM | DIASTOLIC BLOOD PRESSURE: 89 MMHG | RESPIRATION RATE: 14 BRPM | OXYGEN SATURATION: 99 % | SYSTOLIC BLOOD PRESSURE: 117 MMHG

## 2022-05-09 DIAGNOSIS — R13.14 DYSPHAGIA, PHARYNGOESOPHAGEAL PHASE: ICD-10-CM

## 2022-05-09 LAB — SARS-COV-2 N GENE NPH QL NAA+PROBE: NOT DETECTED

## 2022-05-09 PROCEDURE — 88312 SPECIAL STAINS GROUP 1: CPT | Mod: 26

## 2022-05-09 PROCEDURE — 43249 ESOPH EGD DILATION <30 MM: CPT

## 2022-05-09 PROCEDURE — 43239 EGD BIOPSY SINGLE/MULTIPLE: CPT | Mod: 59

## 2022-05-09 PROCEDURE — 88305 TISSUE EXAM BY PATHOLOGIST: CPT | Mod: 26

## 2022-05-09 DEVICE — IMPLANTABLE DEVICE: Type: IMPLANTABLE DEVICE | Status: FUNCTIONAL

## 2022-05-09 NOTE — ASU PREOP CHECKLIST - ADVANCE DIRECTIVE ADDRESSED/READDRESSED
Transplant Surgery Consult Note    Medical record number: [id number]  YOB: 1974,   Consult requested for evaluation of pancreas transplant candidacy.    Assessment and Recommendations: Ms. Tanner Elena is a fair candidate for solitary pancreas transplantation and has a excellent understanding of the risks and benefits of this approach to the management of diabetes. The following issues should be addressed prior to finalizing her transplant candidacy:     Transplant order:  Patient is a complicated history and a number of competing interest.  A successful pancreas transplant could improve both her exocrine and endocrine function.  If this were the case her diarrhea could improve, her nutrition could not prove, and she could potentially come off of her TPN.  In contrast the chronic line and TPN increases her risk of infection particularly under the setting of immunosuppression.  Further, the chronic diarrhea makes absorption of antirejection medication is more challenging.  Final decision regarding his candidacy will be made by the multidisciplinary committee.  I have praised the patient that this could be considered an increased risk transplant because of the risks of infection due to her line, increased risks of rejection due to diarrhea, increased risk of bleeding due to GI varices, and increased risk of clotting due to history of prior pulmonary embolism and likely some degree of hypercoagulability.    Varices and portal venous HTN:  Recommend CT abdomen with liver protocol including portal venous phase to assess portal vein.    The majority of our visit was spent in counselling, discussing the medical and surgical risks of pancreas transplantation, ideal indicators of donor quality and matching strategies and the logistics of the offer/response.  We discussed approximate wait time and how that is influenced by issues such as blood type and sensitization (PRA).  Potential surgical complications of  pancreas transplantation include bleeding, infection, wound complications, leak, graft thrombosis, need for reoperation and other issues such as cardiac complications, pneumonia, deep venous thrombosis, pulmonary embolism, post transplant diabetes and death. We discussed benefits and risks associated with different approaches to exocrine drainage of pancreatic secretions. We also discussed the average length of stay, recovery process, and posttransplant lab and monitoring protocol. We discussed the risk of graft rejection. I emphasized the need for strict immunosuppression adherence and the potential for complications of immunosuppression such as skin cancer or lymphoma, as well as a very low but not zero risk of donor-derived disease transmission risks (infection, cancer) and subsequent renal failure.  Ms. Tanner Elena asked good questions and the patient's candidacy will be reviewed at our Multidisciplinary Selection Committee. Thank you for the opportunity to participate in Ms. Tanner Elena's care.      Total time: 45 minutes  Counselling time: 40 minutes    .      ---------------------------------------------------------------------------------------------------    HPI: Ms. Tanner Elena has an interesting but challenging constellation of medical issues.  She had pancreatic divisum and chronic pancreatitis which led to a total pancreatectomy and auto islet transplant.  She started insulin at that time and has remained insulin-dependent since that time.  Complicating her recovery was the beginning of chronic and refractory diarrhea.  She currently has 20-30 loose bowel movements per day.  This started very soon after her TPI AT procedure.  There was some consideration of whether this was pancreatic exocrine insufficiency she has tried a number of pancreatic enzymes without improvement in symptoms..  Due to the chronic diarrhea she has had nutritional deficiency.  She was able to maintain nutrition on tube feeds in  the past.  However, she developed some complications or infections due to GJ tube and was switched to TPN.  She currently has a indwelling line and continuous TPN infusion.  A further complication is that she developed varices and portal venous clot with recurrent GIB bleeds.  She also had a pulmonary embolism and was maintained on anticoagulation for some period of time.    In addition to her symptoms of pancreatic exocrine insufficiency she has refractory diabetes.  She has hypoglycemic unawareness, neuropathy, and retinopathy.    Gastric pacemaker.      Prior GJ tubes.      Lab Results   Component Value Date    A1C 15.9 03/11/2018    A1C 13.1 07/06/2017    A1C 12.8 06/15/2017    A1C 5.9 08/05/2015    A1C 5.4 01/12/2015       Past Medical History:   Diagnosis Date     Absence of pancreas, acquired total 6/28/2013     Bile duct disease      Chronic pain      Chronic pancreatitis (H) 6/28/2013     Chronic relapsing pancreatitis (H) 6/28/2013     Diabetes mellitus secondary to pancreatectomy (H) 6/28/2013     Diabetes mellitus secondary to pancreatectomy (H) 6/28/2013     DM     Type 1 DM, has insulin pump      Endometriosis      Endometriosis 9/9/2011     Exocrine pancreatic insufficiency 6/28/2013     Gastro-oesophageal reflux disease      Gastroparesis      Gastroparesis 7/23/2012     History of blood transfusion     2013     Iron deficiency anemia 7/12/2013     Other chronic pain      Pancreatic disease     history of pancreatitis     Pancreatic divisum      Pancreatic divisum 10/1/2012     Pneumonia, organism unspecified(486) 7/17/2013     PONV (postoperative nausea and vomiting)     Nausea     Portal vein thrombosis 6/29/2013     Recurrent acute pancreatitis 1/17/2013     Sphincter of Oddi dysfunction 1/17/2013     Syncope     X 2     Past Surgical History:   Procedure Laterality Date     APPENDECTOMY       BACK SURGERY      Neurosimulator removed 4/24/2019     CHOLECYSTECTOMY       COLONOSCOPY  5/21/2014     Procedure: COMBINED COLONOSCOPY, SINGLE BIOPSY/POLYPECTOMY BY BIOPSY;  Surgeon: Hugo Lind MD;  [facility]: [facility]     ENDOSCOPIC INSERTION TUBE GASTROSTOMY  2/5/2014    Procedure: ENDOSCOPIC INSERTION TUBE GASTROSTOMY;  Endoscopic percutaneous gastro-jejunostomy tube placement (removal of previous gastrostomy tube)  ;  Surgeon: Sharon Oh MD;  [facility]     ENDOSCOPIC RETROGRADE CHOLANGIOPANCREATOGRAM  9/9/2011    Procedure:ENDOSCOPIC RETROGRADE CHOLANGIOPANCREATOGRAM; Endoscopic Retrograde Cholangiopancreatogram with C Arm, Biliary Sphincterotomy, insertion Bile Duct Stent X 1; Surgeon:JABIER HEATH; [facility]     ENDOSCOPIC RETROGRADE CHOLANGIOPANCREATOGRAM  12/15/2011    Procedure:ENDOSCOPIC RETROGRADE CHOLANGIOPANCREATOGRAM; Endoscopic Retrograde Cholangiopancreatogram (c-arm), with pancreatic stent placement, sphincterotomy; Surgeon:JABIER HEATH; [facility]     ENDOSCOPIC RETROGRADE CHOLANGIOPANCREATOGRAM  6/14/2012    Procedure: ENDOSCOPIC RETROGRADE CHOLANGIOPANCREATOGRAM;  endoscopic retrograde cholangiopancreatogram with pancreatic stent placement  (c-arm);  Surgeon: Jabier Heath MD;  Location:  OR     ESOPHAGOSCOPY, GASTROSCOPY, DUODENOSCOPY (EGD), COMBINED  11/9/2011    Procedure:COMBINED ESOPHAGOSCOPY, GASTROSCOPY, DUODENOSCOPY (EGD); Surgeon:TANIKA MOHAN; Location: GI     ESOPHAGOSCOPY, GASTROSCOPY, DUODENOSCOPY (EGD), COMBINED  5/21/2014    Procedure: COMBINED ESOPHAGOSCOPY, GASTROSCOPY, DUODENOSCOPY (EGD), BIOPSY SINGLE OR MULTIPLE;  Surgeon: Hugo Lind MD;  [facility]     HC CHANGE GASTROSTOMY TUBE PERC, WO IMAGING OR ENDO GUIDE N/A 11/25/2014    Procedure: CHANGE GASTROSTOMY TUBE WITHOUT SCOPE;  Surgeon: Sharon Oh MD;  Location: [facility]     HC IMPLANT PERIPH/GASTRIC NEUROSTIM/  1/11/2012    gastric pacemaker     HC REPLACE DUODENOSTOMY/JEJUNOSTOMY TUBE PERCUTANEOUS  2/12/2014    Procedure: REPLACE JEJUNOSTOMY  TUBE, PERCUTANEOUS;  Surgeon: Sharon Oh MD;  [facility]     HC UGI ENDOSCOPY W EUS  9/7/2011    Procedure:COMBINED ENDOSCOPIC ULTRASOUND, ESOPHAGOSCOPY, GASTROSCOPY, DUODENOSCOPY (EGD); Surgeon:TREE DEAN; Location[facility]     HC UGI ENDOSCOPY W EUS  12/4/2012    Procedure: COMBINED ENDOSCOPIC ULTRASOUND, ESOPHAGOSCOPY, GASTROSCOPY, DUODENOSCOPY (EGD);  Surgeon: Sharon Oh MD;  [facility]: [facility]     HC UGI ENDOSCOPY W EUS  5/8/2013    Procedure: COMBINED ENDOSCOPIC ULTRASOUND, ESOPHAGOSCOPY, GASTROSCOPY, DUODENOSCOPY (EGD);  Surgeon: Tree Dean MD;  [facility]: [facility]     HC UGI ENDOSCOPY W PLACEMENT GASTROSTOMY TUBE PERCUT N/A 2/19/2016    Procedure: COMBINED ESOPHAGOSCOPY, GASTROSCOPY, DUODENOSCOPY (EGD), PLACE PERCUTANEOUS ENDOSCOPIC GASTROSTOMY TUBE;  Surgeon: Sharon Oh MD;  Location: [facility]     HERNIA REPAIR      Abd hernia repair with mesh      LAPAROSCOPIC IMPLANT GASTRIC STIMULATOR  1/11/2012    Procedure:LAPAROSCOPIC IMPLANT GASTRIC STIMULATOR; LAPAROSCOPIC IMPLANT GASTRIC STIMULATOR ; Surgeon:CHONG HART; [facility]     LAPAROSCOPIC SALPINGO-OOPHORECTOMY       LAPAROSCOPIC SALPINGOTOMY      R     LAPAROTOMY EXPLORATORY  6/29/2013    Procedure: LAPAROTOMY EXPLORATORY;  Exploratory laparotomy, portal vein declotting with tissue plasminogen activator administration, thrombectomy and embolectomy of portal vein, intraoperative trans-sonic flow monitoring, intraoperative ultrasound;  Surgeon: Rajendra Cruz MD;  [facility]     neurostimulator[  5/2011     PANCREATECTOMY, TRANSPLANT AUTO ISLET CELL, COMBINED  6/28/2013    Procedure: COMBINED PANCREATECTOMY, TRANSPLANT AUTO ISLET CELL;  Total Pancreatectomy, Auto Islet Cell Transplant             Anesthesia General with block;  Surgeon: Rajendra Cruz MD;  [facility]     PICC INSERTION Right 06/16/2017    5fr TL Bard PICC, 37cm (3cm external) in the R basilic vein w/ tip in the low SVC     REMOVE AND REPLACE  BREAST IMPLANT PROSTHESIS N/A 3/12/2018    Procedure: PERCUTANEOUS INSERTION TUBE JEJUNOSTOMY;  Percutaneous endoscopic gastrostomy to jejunostomy tube;  Surgeon: Guru Brent Ortiz MD;  Location: [facility]     TRANSPLANT      Auto Islet Transplant in  [facility]     TUBAL LIGATION       Family History   Problem Relation Age of Onset     C.A.D. Father         GI: V, D, anorexia     Blood Disease Father         lymphoma -  from pulmonary fibrosis.     Malignant Hyperthermia Brother      Diabetes Maternal Grandmother         type 2 with older age     Diabetes Maternal Grandfather         type 2 with older age     Cancer - colorectal Maternal Grandfather      Diabetes Paternal Grandmother         type 2 with older age     Connective Tissue Disorder Sister         scleroderma     Social History     Socioeconomic History     Marital status:      Spouse name: Chau     Number of children: 0     Years of education: 24     Highest education level: Not on file   Occupational History     Occupation: ER physician   Social Needs     Financial resource strain: Not on file     Food insecurity:     Worry: Not on file     Inability: Not on file     Transportation needs:     Medical: Not on file     Non-medical: Not on file   Tobacco Use     Smoking status: Never Smoker     Smokeless tobacco: Never Used   Substance and Sexual Activity     Alcohol use: No     Drug use: No     Sexual activity: Yes     Birth control/protection: Surgical     Comment: Lupron   Lifestyle     Physical activity:     Days per week: Not on file     Minutes per session: Not on file     Stress: Not on file   Relationships     Social connections:     Talks on phone: Not on file     Gets together: Not on file     Attends Zoroastrianism service: Not on file     Active member of club or organization: Not on file     Attends meetings of clubs or organizations: Not on file     Relationship status: Not on file     Intimate partner violence:     Fear  "of current or ex partner: Not on file     Emotionally abused: Not on file     Physically abused: Not on file     Forced sexual activity: Not on file   Other Topics Concern     Parent/sibling w/ CABG, MI or angioplasty before 65F 55M? Not Asked      Service Not Asked     Blood Transfusions No     Caffeine Concern Not Asked     Occupational Exposure Not Asked     Hobby Hazards Not Asked     Sleep Concern Not Asked     Stress Concern Not Asked     Weight Concern Not Asked     Special Diet Not Asked     Back Care Not Asked     Exercise Not Asked     Bike Helmet Not Asked     Seat Belt Yes     Self-Exams Not Asked   Social History Narrative     Not on file     And  Allergies:   Allergies   Allergen Reactions     Penicillin G Anaphylaxis     Penicillins Anaphylaxis     2013 - pt tolerated ertapenem     Corticosteroids Other (See Comments)     All oral,IV and injectable steroids are contraindicated (unless in life threatening situations) in Islet Auto transplant recipients. They can cause irreversible loss of islet cell function. Please contact patients transplant care coordinator LAUREN Gerard RN at /pager   and Endocrinologist prior to administration.     Ertapenem Other (See Comments)     Elevated LFT's  Elevated LFT's     Merrem [Meropenem] Other (See Comments)     Elevated LFTs >3x, happened with invanz too  Elevated LFT's     Vancomycin Other (See Comments)     osiel syndrome     Vancomycin Other (See Comments)     Gets \"red lizandro syndrome\"       Medications:  Prescription Medications as of 2019       Rx Number Disp Refills Start End Last Dispensed Date Next Fill Date Owning Pharmacy    KELLIU-[place]K FASTCLIX L[place] [facility] 3 [place]    [facility] - [place], [place] - [address] [place] [address]    Si each every 2 hours as needed.    Class: E-Prescribe    Notes to Pharmacy: Pt is testing up to 12 times a day    Route: Does not apply    acetaminophen " (TYLENOL) 32 mg/mL solution  850 mL 0 3/18/2018    [address]    Sig: Take 20.3 mLs (650 mg) by mouth every 6 hours as needed for mild pain or fever    Class: E-Prescribe    Route: Oral    Alcohol Swabs 70 % PADS  1 each 3 7/12/2013    [address]    Sig: Use to check blood glucose 6-8 times daily    Class: E-Prescribe    amitriptyline (ELAVIL) 100 MG tablet  30 tablet 11 3/11/2015    [facility], [place] - [address] [facility]    Sig: Take 1 tablet (100 mg) by mouth At Bedtime    Class: E-Prescribe    Route: Oral    amylase-lipase-protease (CREON 24) 44978-12913 UNITS CPEP per EC capsule  180 capsule 1 3/18/2018    [address]    Sig: Take 1 capsule (24,000 Units) by mouth every 4 hours as needed If ReliZORB unavailable: Open capsule and empty contents into 15 ml sodium bicarb solution (see sodium bicarb order), let dissolve for about 20-30 minutes and then add this solution to the amount of TF formula hung in TF bag every 4 hrs (i.e., once TF @ goal infusion 40 ml/hr will mix 2 capsules into 160 ml of TF formula every 4 hrs).    Class: Historical    Route: Oral    amylase-lipase-protease (CREON) 83240-80167 UNITS CPEP per EC capsule  450 capsule 0 3/18/2018    [address]    Sig: Open capsule and empty contents into 15 ml sodium bicarb solution (see sodium bicarb order), let dissolve for about 20-30 minutes and then add this solution to the amount of TF formula hung in TF bag every 4 hrs (i.e., once TF @ goal infusion 40 ml/hr will mix 2 capsules into 160 ml of TF formula every 4 hrs).    Class: Historical    BD SHARPS CONTAINER HOME MISC  1 each 0 7/12/2013    [address]    Sig: Use to dispose of used  sharps    Class: E-Prescribe    bumetanide (BUMEX) 1 MG tablet    8/18/2019 8/22/2020       Sig: Take 1 mg by mouth    Class: Historical    Route: Oral    Cholecalciferol (VITAMIN D3 PO)            Sig: Take 8,000 Units by mouth daily     Class: Historical    Route: Oral    dicyclomine (BENTYL) 10 MG capsule    12/13/2017        Sig: Take one QID prn spasms.    Class: Historical    DULOXETINE HCL PO            Sig: Take 30 mg by mouth 2 times daily    Class: Historical    Route: Oral    ergocalciferol (DRISDOL) 8000 UNIT/ML drops  30 mL 11 7/6/2017    [address]    Sig: Take 0.75 mLs (6,000 Units) by mouth daily    Class: E-Prescribe    Notes to Pharmacy: Needs drop formulation due to malabsoroption issues.    Route: Oral    esomeprazole (NEXIUM) 40 MG capsule  90 capsule 3 3/11/2015    [address]    Sig: Take 1 capsule (40 mg) by mouth 2 times daily Take 30-60 minutes before eating.    Class: E-Prescribe    Route: Oral    fentaNYL (DURAGESIC) 25 mcg/hr 72 hr patch    9/12/2019        Sig: Place 1 patch onto the skin    Class: Historical    Earliest Fill Date: 9/12/2019    Route: Transdermal    gabapentin (NEURONTIN) 300 MG capsule    2/26/2019        Sig: Take 600 mg by mouth    Class: Historical    Route: Oral    glucagon (GLUCAGON EMERGENCY) 1 MG kit  1 mg 1 7/7/2017 7/7/2017   [address]    Sig: Inject 1 mg into the muscle once for 1 dose    Class: E-Prescribe    Route: Intramuscular    glucose 40 % GEL  10 each 1 7/12/2013    [address]    Sig: Take 15 g by mouth every hour as needed.    Class: E-Prescribe    Route: Oral    glucose blood VI test strips strip  3 Month 3 7/12/2013    [address]    Sig: Use 6-8 times daily to check blood  glucose levels.  (Accu-chek Smartview test strips)    Class: E-Prescribe    heparin 100 UNIT/ML SOLN injection    2019        Sig: Inject 300 Units into the vein    Class: Historical    Route: Intravenous    Insulin Aspart (INSULIN PUMP - OUTPATIENT)            Si unit/hour    Class: Historical    insulin bolus from AMBULATORY PUMP    3/18/2018    [address]    Sig: Insulin dose = 1 units for 20 grams of carbohydrate    Class: Historical    insulin bolus from AMBULATORY PUMP    3/18/2018    [address]    Sig: Inject Subcutaneous 3 times daily (before meals) Insulin dose = 1 units for 20 grams of carbohydrate    Class: Historical    Route: Subcutaneous    insulin bolus from AMBULATORY PUMP    3/18/2018    [address]    Sig: Inject Subcutaneous 4 times daily (before meals and nightly) Bolus-Correction 1 unit(s) to lower blood glucose by 75 mg/dL    Class: Historical    Route: Subcutaneous    insulin regular (HUMULIN R/NOVOLIN R VIAL) 100 UNIT/ML vial            Sig: Inject 20 Units Subcutaneous    Class: Historical    Route: Subcutaneous    lifitegrast (XIIDRA) 5 % opthalmic solution    2018       Si drop    Class: Historical    metoclopramide (REGLAN) 10 MG tablet            Sig: Take 1 tablet by mouth every 6 hours as needed    Class: Historical    Route: Oral    mirtazapine (REMERON) 15 MG tablet  30 tablet 1 3/18/2018    [address]    Sig: Take 0.5 tablets (7.5 mg) by mouth At Bedtime    Class: E-Prescribe    Route: Oral    multivitamin CF formula (AQUADEKS) LIQD liquid  120 mL 2 3/19/2018    [address]    Si mLs by Per Feeding Tube route daily    Class: E-Prescribe    Route: Per Feeding  Tube    nadolol (CORGARD) 40 MG tablet    2019        Sig: Take 40 mg by mouth    Class: Historical    Route: Oral    norethindrone-ethinyl estradiol-iron (MICROGESTIN FE1.5/30) 1.5-30 MG-MCG tablet    2019        Sig: Take 1 tablet by mouth    Class: Historical    Route: Oral    ondansetron (ZOFRAN-ODT) 8 MG disintegrating tablet            Sig: Every 4-6 hours as needed    Class: Historical    Route: Oral    oxyCODONE (ROXICODONE) 5 MG/5ML solution  2100 mL 0 3/18/2018    [address]    Sig: Take 5-10 mLs (5-10 mg) by mouth every 4 hours as needed for moderate to severe pain    Class: Local Print    Earliest Fill Date: 3/18/2018    Route: Oral    polyethylene glycol (MIRALAX/GLYCOLAX) Packet  7 packet 1 3/18/2018    [address]    Sig: Take 8.5 g by mouth 2 times daily    Class: E-Prescribe    Route: Oral    Probiotic Product (PROBIOTIC DAILY PO)            Sig: Take 2 tablets by mouth daily    Class: Historical    Route: Oral    prochlorperazine (COMPAZINE) 10 MG tablet  120 tablet 0 3/18/2018    [address]    Sig: Take 1 tablet (10 mg) by mouth every 6 hours as needed for vomiting    Class: E-Prescribe    Route: Oral    rizatriptan (MAXALT) 10 MG tablet            Sig: Take 10 mg by mouth at onset of headache     Class: Historical    Route: Oral    sodium bicarbonate 325 MG tablet  180 tablet 1 3/18/2018    [address]    Si tablet (325 mg) by Per J Tube route every 4 hours as needed (if ReliZORB unavailable) If ReliZORB unavailable: Crush 1 tablet and mix into 15 ml of warm water and use this solution to mix with Creon pancreatic enzymes. DO NOT administer directly into Jtube (to be mixed into TF formula with Creon enzyme - see Creon enzyme order)    Class: Local  Print    Route: Per J Tube    temazepam (RESTORIL) 15 MG capsule    6/13/2019        Sig: Take 15 mg by mouth    Class: Historical    Route: Oral    tiZANidine (ZANAFLEX) 2 MG tablet    8/30/2019        Sig: Take 2 tablets by mouth every night at bedtime    Class: Historical          Exam    Just diagnostics:   Recent Results (from the past 672 hour(s))   EKG 12-lead, tracing only [EKG1]    Collection Time: 09/23/19 11:17 AM   Result Value Ref Range    Interpretation ECG Click View Image link to view waveform and result    CBC with platelets differential [YCY026]    Collection Time: 09/23/19 12:53 PM   Result Value Ref Range    WBC 5.9 4.0 - 11.0 10e9/L    RBC Count 3.02 (L) 3.8 - 5.2 10e12/L    Hemoglobin 8.6 (L) 11.7 - 15.7 g/dL    Hematocrit 27.5 (L) 35.0 - 47.0 %    MCV 91 78 - 100 fl    MCH 28.5 26.5 - 33.0 pg    MCHC 31.3 (L) 31.5 - 36.5 g/dL    RDW 13.6 10.0 - 15.0 %    Platelet Count 165 150 - 450 10e9/L    Diff Method Automated Method     % Neutrophils 73.2 %    % Lymphocytes 21.2 %    % Monocytes 3.8 %    % Eosinophils 1.0 %    % Basophils 0.3 %    % Immature Granulocytes 0.5 %    Nucleated RBCs 0 0 /100    Absolute Neutrophil 4.3 1.6 - 8.3 10e9/L    Absolute Lymphocytes 1.2 0.8 - 5.3 10e9/L    Absolute Monocytes 0.2 0.0 - 1.3 10e9/L    Absolute Eosinophils 0.1 0.0 - 0.7 10e9/L    Absolute Basophils 0.0 0.0 - 0.2 10e9/L    Abs Immature Granulocytes 0.0 0 - 0.4 10e9/L    Absolute Nucleated RBC 0.0    Phosphorus    Collection Time: 09/23/19 12:53 PM   Result Value Ref Range    Phosphorus 3.5 2.5 - 4.5 mg/dL   Comprehensive metabolic panel [LAB17]    Collection Time: 09/23/19 12:53 PM   Result Value Ref Range    Sodium 133 133 - 144 mmol/L    Potassium 3.7 3.4 - 5.3 mmol/L    Chloride 98 94 - 109 mmol/L    Carbon Dioxide 27 20 - 32 mmol/L    Anion Gap 8 3 - 14 mmol/L    Glucose 180 (H) 70 - 99 mg/dL    Urea Nitrogen 12 7 - 30 mg/dL    Creatinine 0.56 0.52 - 1.04 mg/dL    GFR Estimate >90 >60 mL/min/[1.73_m2]     GFR Estimate If Black >90 >60 mL/min/[1.73_m2]    Calcium 8.5 8.5 - 10.1 mg/dL    Bilirubin Total 0.2 0.2 - 1.3 mg/dL    Albumin 3.5 3.4 - 5.0 g/dL    Protein Total 7.6 6.8 - 8.8 g/dL    Alkaline Phosphatase 154 (H) 40 - 150 U/L    ALT 35 0 - 50 U/L    AST 38 0 - 45 U/L   UA with Microscopic reflex to Culture    Collection Time: 09/23/19 12:58 PM   Result Value Ref Range    Color Urine Yellow     Appearance Urine Clear     Glucose Urine 50 (A) NEG^Negative mg/dL    Bilirubin Urine Negative NEG^Negative    Ketones Urine Negative NEG^Negative mg/dL    Specific Gravity Urine 1.011 1.003 - 1.035    Blood Urine Negative NEG^Negative    pH Urine 7.0 5.0 - 7.0 pH    Protein Albumin Urine Negative NEG^Negative mg/dL    Urobilinogen mg/dL 0.0 0.0 - 2.0 mg/dL    Nitrite Urine Negative NEG^Negative    Leukocyte Esterase Urine Negative NEG^Negative    Source Midstream Urine     WBC Urine 2 0 - 5 /HPF    RBC Urine 1 0 - 2 /HPF    Bacteria Urine Few (A) NEG^Negative /HPF    Squamous Epithelial /HPF Urine 2 (H) 0 - 1 /HPF        done

## 2022-05-16 LAB — SURGICAL PATHOLOGY STUDY: SIGNIFICANT CHANGE UP

## 2022-05-19 ENCOUNTER — APPOINTMENT (OUTPATIENT)
Dept: OTOLARYNGOLOGY | Facility: CLINIC | Age: 27
End: 2022-05-19
Payer: MEDICARE

## 2022-05-19 VITALS
HEART RATE: 71 BPM | SYSTOLIC BLOOD PRESSURE: 112 MMHG | HEIGHT: 60 IN | TEMPERATURE: 97.1 F | BODY MASS INDEX: 27.09 KG/M2 | OXYGEN SATURATION: 97 % | WEIGHT: 138 LBS | DIASTOLIC BLOOD PRESSURE: 71 MMHG

## 2022-05-19 DIAGNOSIS — H61.23 IMPACTED CERUMEN, BILATERAL: ICD-10-CM

## 2022-05-19 DIAGNOSIS — T17.900A UNSPECIFIED FOREIGN BODY IN RESPIRATORY TRACT, PART UNSPECIFIED CAUSING ASPHYXIATION, INITIAL ENCOUNTER: ICD-10-CM

## 2022-05-19 PROCEDURE — 99204 OFFICE O/P NEW MOD 45 MIN: CPT | Mod: 25

## 2022-05-19 PROCEDURE — 92579 VISUAL AUDIOMETRY (VRA): CPT

## 2022-05-19 PROCEDURE — 92588 EVOKED AUDITORY TST COMPLETE: CPT

## 2022-05-19 PROCEDURE — 92550 TYMPANOMETRY & REFLEX THRESH: CPT

## 2022-05-19 PROCEDURE — 31575 DIAGNOSTIC LARYNGOSCOPY: CPT

## 2022-05-19 PROCEDURE — G0268 REMOVAL OF IMPACTED WAX MD: CPT

## 2022-05-19 NOTE — HISTORY OF PRESENT ILLNESS
[de-identified] : Trisomy 21 pt referred by Dr. Rodriguez for a possible laryngeal mass seen during an EGD recently. Several years of solids sticking luna when trying to swallow "too big" of a bite; this prompts a coughing episode and partial regurgitation of mainly secretions. No weight loss or hoarseness. Hx a lung abscess in 2018 but no episodes of pna. \par Rare snoring, no witnessed apneas.

## 2022-05-19 NOTE — DATA REVIEWED
[de-identified] : today: couldn't condition for VRA; DP & TEOAEs partially present, can't r/o mild HL. \par - Immitance testing w/ type A AU\par \par  [de-identified] : 8/20 MBS: trace deep, silent laryngeal penetration; mild-mod pharyngeal stage dysphagia\par 3/20: nl esophagram & UGI series

## 2022-05-19 NOTE — PROCEDURE
[Video Captured] : video captured and filed [de-identified] : Indication: requirement for exam not possible via anterior examination; r/o laryngopharyngeal mass\par After verbal consent and the administration of an aerosolized phenylephrine/lidocaine mix, examination was performed with a flexible endoscope placed through the nose. Findings:\par Nasopharynx: unremarkable\par Soft palate, lateral and posterior pharyngeal walls: large tonsils\par Base of tongue & lingual tonsil: very large lingual tonsil\par Vallecula: unremarkable\par Epiglottis: unremarkable\par Piriform sinuses and pharyngoesophageal junction: unremarkable\par Arytenoids and AE folds: mild interarytenoid edema\par Ventricle and false vocal folds: unremarkable\par True vocal folds: Smooth free edge; surface without ectasias or edema; normal movement bilaterally with good apposition in phonation\par Visible subglottis: poorly vis\par Other findings: ELM

## 2022-05-19 NOTE — ASSESSMENT
[FreeTextEntry1] : We discussed his very large tonsils & lingual tonsil which likely accounted for the visualized abnormality on EGD. \par \par More concerning is the hx of silent aspiration & a lung abscess; I asked them to see speech tx & Dr. Flynn at [facility] for an attempt at a FEES. \par \par Discussed how to have a family member perform a home apnea screen; bring this information upon return along with cell phone video if unsure.\par

## 2022-05-19 NOTE — PHYSICAL EXAM
[Binocular Microscopic Exam] : Binocular microscopic exam was performed [FreeTextEntry8] : cerumen impaction removed with a hook [FreeTextEntry9] : Large cerumen plug was removed w/ a hook [Laryngoscopy Performed] : laryngoscopy was performed, see procedure section for findings [de-identified] : 3-4+ [Normal] : assessment of respiratory effort is normal

## 2022-05-19 NOTE — CONSULT LETTER
[Dear  ___] : Dear  [unfilled], [Consult Letter:] : I had the pleasure of evaluating your patient, [unfilled]. [Please see my note below.] : Please see my note below. [Consult Closing:] : Thank you very much for allowing me to participate in the care of this patient.  If you have any questions, please do not hesitate to contact me. [Sincerely,] : Sincerely, [FreeTextEntry3] : SAHLEY Stephenson Jr, MD, FAAOHNS\par Otolaryngologist\par [facility]

## 2022-06-02 ENCOUNTER — APPOINTMENT (OUTPATIENT)
Dept: OTOLARYNGOLOGY | Facility: CLINIC | Age: 27
End: 2022-06-02
Payer: MEDICARE

## 2022-06-02 VITALS
OXYGEN SATURATION: 98 % | BODY MASS INDEX: 27.09 KG/M2 | WEIGHT: 138 LBS | HEART RATE: 84 BPM | TEMPERATURE: 97 F | DIASTOLIC BLOOD PRESSURE: 59 MMHG | HEIGHT: 60 IN | SYSTOLIC BLOOD PRESSURE: 84 MMHG

## 2022-06-02 DIAGNOSIS — J35.1 HYPERTROPHY OF TONSILS: ICD-10-CM

## 2022-06-02 DIAGNOSIS — R13.14 DYSPHAGIA, PHARYNGOESOPHAGEAL PHASE: ICD-10-CM

## 2022-06-02 PROCEDURE — 99215 OFFICE O/P EST HI 40 MIN: CPT | Mod: 25

## 2022-06-02 PROCEDURE — 92612 ENDOSCOPY SWALLOW (FEES) VID: CPT

## 2022-06-02 NOTE — DATA REVIEWED
[de-identified] : -\par [facility] 8/26/2020\par Assessment\par \par Patient presents with Functional Oral and Mild to Moderate Pharyngeal Stage Dysphagia. The Oral Stage is characterized by adequate oral containment, adequate chewing/mashing for solid consistency, adequate bolus manipulation, adequate tongue motion with adequate anterior to posterior transfer of the bolus for solids/puree; piecemeal transfer and deglutition for solids (2 to 3x) with adequate oral clearance post swallow. The Pharyngeal Stage is characterized by delayed initiation of the pharyngeal swallow (Bolus head at the vallecular for Thin liquids), reduced laryngeal elevation with incomplete laryngeal vestibular closure, mildly reduced tongue base retraction resulting in trace base of tongue/vallecular residue post primary swallow for solids/puree and adequate pharyngeal constriction. There was Trace Deep Laryngeal Penetration during the swallow to the level of the vocal folds for Thin Liquids. Patient is not sensate to the Laryngeal Penetration given no reflexive cough response. Patient is verbally cued to cough to clear the trace contrast from the vocal folds. There was No Laryngeal Penetration and No Aspiration for Nectar Thick liquids/Puree/Solids. \par \par Of Note: Patient's father was also in the Fluoroscopy Suite/Room to provide support/assistance for participation for swallow study. Patient spontaneously move forward/backward; up/down and hyperextended his head back during PO intake at times. \par \par \par  \par \par  \par Plan\par \par Recommendations/Plan:. \par 1.) Regular Consistency with [place] Thick Liquids\par 2.) Feeding/Swallowing Guidelines: Sit upright, encourage small bites, encourage chewing for solids, encourage single cup sips of nectar thick liquids\par 3.) Aspiration Precautions\par 4.) Maintain Good Oral Hygiene Care\par 5.) Follow up with Physician\par 6.) Consider trial swallow therapy \par

## 2022-06-02 NOTE — HISTORY OF PRESENT ILLNESS
[de-identified] : 6/2/22\par 26M presents with father Paco who states that pt has had difficulty swallowing which has been going on for several years. This usually occurs after he eats solid foods. He is able to clear his mouth but then starts to cough up undigested food with mucus that occurs after meals along with choking.  Most of the time he is able to swallow solids and liquids without issue.  He is not losing weight. \par \par Has previously working with SLP years ago, but not for swallowing issues.  Has been seen by GI with normal upper endoscopy, and eval with ENT, Dr. Stephenson, also essentially normal, other than large tonsils.  Pt had MBS at [facility] in 2020 with concern for deep silent penetration of liquids, and he has had lung abscesses in the past as well.\par \par Now, he eats small pieces of food and eats slowly. In the past 2 weeks, he has not had any episodes. Although this has occurred multiple times throughout Denies difficulty breathing. No voice changes. He has done and esophagram, endoscopy, MBS. Has not had SLP eval. No other ENT issues.

## 2022-06-02 NOTE — PROCEDURE
[de-identified] : -\par Flexible Laryngoscopy - and Functional endoscopic evaluation of swallowing\par \par Pre-operative Diagnosis: dysphagia\par Post-operative Diagnosis: large base of tongue, no penetration or aspiration noted\par Anesthesia: Topical - 1 % Lidocaine/Phenylephrine\par Procedure: Flexible Laryngoscopy\par \par Procedure Details: \par The patient was placed in the sitting position. After decongestant and anesthesia were applied the laryngoscope was passed. The nasal cavities, nasopharynx, oropharynx, hypopharynx, and larynx were all examined. Vocal folds were examined during respiration and phonation. The following findings were noted:\par \par Findings: \par Nose: Septum is midline, turbinates are normal, nasal airways patent, mucosa normal\par Nasopharynx: Adenoids normal, no masses, eustachian tube normal\par Oropharynx: Pharyngeal walls symmetric and without lesion. Tonsils/fossae symmetric and normal.\par Hypopharynx: Hypopharynx and pyriform sinuses without lesion. No masses or asymmetry. No pooling of secretions.\par Larynx: Epiglottis and aryepiglottic folds were sharp and crisp bilaterally. Bilateral false and true vocal folds normal appearance. Bilateral vocal folds fully mobile and symmetric. Airway was widely patent. \par \par Condition: Stable. Patient tolerated procedure well.\par \par Complications: None\par \par -----\par \par Fiberoptic Endoscopic Evaluation of Swallow (FEES) with sensory testing - [facility] [address] :\par \par Performed while patient in flexion and partially restrained\par \par Water:\par aspiration: none\par penetration: none\par \par Apple Sauce:\par aspiration: none\par penetration: none\par \par Zachary Cracker:\par aspiration: none\par penetration: none\par \par \par \par

## 2022-06-02 NOTE — ASSESSMENT
[FreeTextEntry1] : 26 year old male with history of Trisomy 21 presents with father Chung, complaining of dysphagia over the past few years. Previous workup by ENT and GI has been unrevealing. \par \par Fiberoptic Endoscopic Evaluation of Swallow (FEES) was performed and essentially normal today. Based on his history and exam findings, I recommend SLP consultation for swallowing recommendations and possible repeat modified barium swallow study. Follow up with us after SLP evaluation and any necessary testing.  \par \par - SLP consult, possible MBS\par - fu with us after to review. \par - Regular Consistency with [place] Thick Liquids\par - Feeding/Swallowing Guidelines: Sit upright, encourage small bites, encourage chewing for solids, encourage single cup sips of nectar thick liquids\par - Aspiration Precautions\par - Maintain Good Oral Hygiene Care\par

## 2022-06-03 ENCOUNTER — NON-APPOINTMENT (OUTPATIENT)
Age: 27
End: 2022-06-03

## 2022-06-06 ENCOUNTER — NON-APPOINTMENT (OUTPATIENT)
Age: 27
End: 2022-06-06

## 2022-06-16 ENCOUNTER — APPOINTMENT (OUTPATIENT)
Dept: SPEECH THERAPY | Facility: CLINIC | Age: 27
End: 2022-06-16
Payer: MEDICARE

## 2022-06-16 PROCEDURE — 92610 EVALUATE SWALLOWING FUNCTION: CPT | Mod: GN

## 2022-07-07 PROBLEM — R13.10 DYSPHAGIA: Status: ACTIVE | Noted: 2022-06-02

## 2022-07-07 NOTE — ASSESSMENT
[FreeTextEntry1] : Plan for EGD, dilation of any strictures\par This may be related also to how he eats

## 2022-07-07 NOTE — HISTORY OF PRESENT ILLNESS
[FreeTextEntry1] : 26 with developmental cognitive delay has been having dysphagia. Dysphagia is oropharyngeal but he has not had a good assessment of his esophagus and does have esophageal reflux symptoms. He denies chest pain. No food impactions. no back pain. No abdominal pain, cramping, or dyspepsia. Weight is stable and appetite is stable. Symptoms are intermittent and he has good and bad days. When he eats fast he seems to having choking and dysphagia. This may also be exacerbated by stress/anxiety as per father who is reporting history.

## 2022-07-07 NOTE — PHYSICAL EXAM
[General Appearance - Alert] : alert [General Appearance - In No Acute Distress] : in no acute distress [General Appearance - Well Nourished] : well nourished [General Appearance - Well Developed] : well developed [General Appearance - Well-Appearing] : healthy appearing [Sclera] : the sclera and conjunctiva were normal [Neck Appearance] : the appearance of the neck was normal [Neck Cervical Mass (___cm)] : no neck mass was observed [Jugular Venous Distention Increased] : there was no jugular-venous distention [Exaggerated Use Of Accessory Muscles For Inspiration] : no accessory muscle use [Heart Sounds] : normal S1 and S2 [Edema] : there was no peripheral edema [Bowel Sounds] : normal bowel sounds [Abdomen Soft] : soft [Abdomen Tenderness] : non-tender [] : no hepato-splenomegaly [Cervical Lymph Nodes Enlarged Posterior Bilaterally] : posterior cervical [Cervical Lymph Nodes Enlarged Anterior Bilaterally] : anterior cervical [No CVA Tenderness] : no ~M costovertebral angle tenderness [No Spinal Tenderness] : no spinal tenderness [Nail Clubbing] : no clubbing  or cyanosis of the fingernails [No Focal Deficits] : no focal deficits [Oriented To Time, Place, And Person] : oriented to person, place, and time [Impaired Insight] : insight and judgment were intact [Affect] : the affect was normal

## 2022-07-14 ENCOUNTER — NON-APPOINTMENT (OUTPATIENT)
Age: 27
End: 2022-07-14

## 2022-07-18 ENCOUNTER — NON-APPOINTMENT (OUTPATIENT)
Age: 27
End: 2022-07-18

## 2022-07-21 ENCOUNTER — APPOINTMENT (OUTPATIENT)
Dept: SPEECH THERAPY | Facility: CLINIC | Age: 27
End: 2022-07-21

## 2022-07-21 PROCEDURE — 92526 ORAL FUNCTION THERAPY: CPT | Mod: GN

## 2022-08-04 ENCOUNTER — NON-APPOINTMENT (OUTPATIENT)
Age: 27
End: 2022-08-04

## 2022-08-04 ENCOUNTER — APPOINTMENT (OUTPATIENT)
Dept: SPEECH THERAPY | Facility: CLINIC | Age: 27
End: 2022-08-04

## 2022-08-11 ENCOUNTER — APPOINTMENT (OUTPATIENT)
Dept: SPEECH THERAPY | Facility: CLINIC | Age: 27
End: 2022-08-11

## 2022-08-11 PROCEDURE — 92526 ORAL FUNCTION THERAPY: CPT | Mod: GN

## 2022-08-16 ENCOUNTER — APPOINTMENT (OUTPATIENT)
Dept: SPEECH THERAPY | Facility: CLINIC | Age: 27
End: 2022-08-16

## 2022-08-23 ENCOUNTER — APPOINTMENT (OUTPATIENT)
Dept: SPEECH THERAPY | Facility: CLINIC | Age: 27
End: 2022-08-23

## 2022-08-23 PROCEDURE — 92526 ORAL FUNCTION THERAPY: CPT | Mod: GN

## 2022-09-06 ENCOUNTER — APPOINTMENT (OUTPATIENT)
Dept: SPEECH THERAPY | Facility: CLINIC | Age: 27
End: 2022-09-06

## 2022-09-27 ENCOUNTER — APPOINTMENT (OUTPATIENT)
Dept: SPEECH THERAPY | Facility: CLINIC | Age: 27
End: 2022-09-27

## 2022-09-27 PROCEDURE — 92526 ORAL FUNCTION THERAPY: CPT | Mod: GN

## 2022-09-29 ENCOUNTER — NON-APPOINTMENT (OUTPATIENT)
Age: 27
End: 2022-09-29

## 2022-10-04 ENCOUNTER — APPOINTMENT (OUTPATIENT)
Dept: SPEECH THERAPY | Facility: CLINIC | Age: 27
End: 2022-10-04

## 2022-10-04 PROCEDURE — 92526 ORAL FUNCTION THERAPY: CPT | Mod: GN

## 2022-10-11 ENCOUNTER — APPOINTMENT (OUTPATIENT)
Dept: SPEECH THERAPY | Facility: CLINIC | Age: 27
End: 2022-10-11

## 2022-10-11 PROCEDURE — 92526 ORAL FUNCTION THERAPY: CPT | Mod: GN

## 2022-10-18 ENCOUNTER — APPOINTMENT (OUTPATIENT)
Dept: SPEECH THERAPY | Facility: CLINIC | Age: 27
End: 2022-10-18

## 2022-10-25 ENCOUNTER — APPOINTMENT (OUTPATIENT)
Dept: SPEECH THERAPY | Facility: CLINIC | Age: 27
End: 2022-10-25

## 2022-10-25 PROCEDURE — 92526 ORAL FUNCTION THERAPY: CPT | Mod: GN

## 2022-11-01 ENCOUNTER — APPOINTMENT (OUTPATIENT)
Dept: SPEECH THERAPY | Facility: CLINIC | Age: 27
End: 2022-11-01

## 2022-11-10 ENCOUNTER — APPOINTMENT (OUTPATIENT)
Dept: SPEECH THERAPY | Facility: CLINIC | Age: 27
End: 2022-11-10

## 2022-11-10 PROCEDURE — 92526 ORAL FUNCTION THERAPY: CPT | Mod: GN

## 2022-11-22 ENCOUNTER — APPOINTMENT (OUTPATIENT)
Dept: SPEECH THERAPY | Facility: CLINIC | Age: 27
End: 2022-11-22

## 2022-11-22 PROCEDURE — 92526 ORAL FUNCTION THERAPY: CPT | Mod: GN

## 2022-11-29 ENCOUNTER — APPOINTMENT (OUTPATIENT)
Dept: SPEECH THERAPY | Facility: CLINIC | Age: 27
End: 2022-11-29

## 2022-12-01 ENCOUNTER — APPOINTMENT (OUTPATIENT)
Dept: SPEECH THERAPY | Facility: CLINIC | Age: 27
End: 2022-12-01

## 2022-12-01 PROCEDURE — 92526 ORAL FUNCTION THERAPY: CPT | Mod: GN

## 2023-10-18 ENCOUNTER — EMERGENCY (EMERGENCY)
Facility: HOSPITAL | Age: 28
LOS: 0 days | Discharge: HOME | End: 2023-10-18
Attending: STUDENT IN AN ORGANIZED HEALTH CARE EDUCATION/TRAINING PROGRAM
Payer: MEDICARE

## 2023-10-18 VITALS
RESPIRATION RATE: 19 BRPM | HEART RATE: 95 BPM | TEMPERATURE: 98 F | DIASTOLIC BLOOD PRESSURE: 71 MMHG | SYSTOLIC BLOOD PRESSURE: 115 MMHG | OXYGEN SATURATION: 99 %

## 2023-10-18 VITALS — HEIGHT: 60 IN | WEIGHT: 134.92 LBS

## 2023-10-18 DIAGNOSIS — R09.A2 FOREIGN BODY SENSATION, THROAT: ICD-10-CM

## 2023-10-18 DIAGNOSIS — Q90.9 DOWN SYNDROME, UNSPECIFIED: ICD-10-CM

## 2023-10-18 LAB
ALBUMIN SERPL ELPH-MCNC: 4 G/DL — SIGNIFICANT CHANGE UP (ref 3.3–5)
ALBUMIN SERPL ELPH-MCNC: 4 G/DL — SIGNIFICANT CHANGE UP (ref 3.3–5)
ALP SERPL-CCNC: 75 U/L — SIGNIFICANT CHANGE UP (ref 40–120)
ALP SERPL-CCNC: 75 U/L — SIGNIFICANT CHANGE UP (ref 40–120)
ALT FLD-CCNC: 30 U/L — SIGNIFICANT CHANGE UP (ref 12–78)
ALT FLD-CCNC: 30 U/L — SIGNIFICANT CHANGE UP (ref 12–78)
ANION GAP SERPL CALC-SCNC: 7 MMOL/L — SIGNIFICANT CHANGE UP (ref 5–17)
ANION GAP SERPL CALC-SCNC: 7 MMOL/L — SIGNIFICANT CHANGE UP (ref 5–17)
AST SERPL-CCNC: 17 U/L — SIGNIFICANT CHANGE UP (ref 15–37)
AST SERPL-CCNC: 17 U/L — SIGNIFICANT CHANGE UP (ref 15–37)
BASOPHILS # BLD AUTO: 0.08 K/UL — SIGNIFICANT CHANGE UP (ref 0–0.2)
BASOPHILS # BLD AUTO: 0.08 K/UL — SIGNIFICANT CHANGE UP (ref 0–0.2)
BASOPHILS NFR BLD AUTO: 1.1 % — SIGNIFICANT CHANGE UP (ref 0–2)
BASOPHILS NFR BLD AUTO: 1.1 % — SIGNIFICANT CHANGE UP (ref 0–2)
BILIRUB SERPL-MCNC: 0.6 MG/DL — SIGNIFICANT CHANGE UP (ref 0.2–1.2)
BILIRUB SERPL-MCNC: 0.6 MG/DL — SIGNIFICANT CHANGE UP (ref 0.2–1.2)
BUN SERPL-MCNC: 18 MG/DL — SIGNIFICANT CHANGE UP (ref 7–23)
BUN SERPL-MCNC: 18 MG/DL — SIGNIFICANT CHANGE UP (ref 7–23)
CALCIUM SERPL-MCNC: 9 MG/DL — SIGNIFICANT CHANGE UP (ref 8.5–10.1)
CALCIUM SERPL-MCNC: 9 MG/DL — SIGNIFICANT CHANGE UP (ref 8.5–10.1)
CHLORIDE SERPL-SCNC: 109 MMOL/L — HIGH (ref 96–108)
CHLORIDE SERPL-SCNC: 109 MMOL/L — HIGH (ref 96–108)
CO2 SERPL-SCNC: 26 MMOL/L — SIGNIFICANT CHANGE UP (ref 22–31)
CO2 SERPL-SCNC: 26 MMOL/L — SIGNIFICANT CHANGE UP (ref 22–31)
CREAT SERPL-MCNC: 1.26 MG/DL — SIGNIFICANT CHANGE UP (ref 0.5–1.3)
CREAT SERPL-MCNC: 1.26 MG/DL — SIGNIFICANT CHANGE UP (ref 0.5–1.3)
EGFR: 80 ML/MIN/1.73M2 — SIGNIFICANT CHANGE UP
EGFR: 80 ML/MIN/1.73M2 — SIGNIFICANT CHANGE UP
EOSINOPHIL # BLD AUTO: 0.01 K/UL — SIGNIFICANT CHANGE UP (ref 0–0.5)
EOSINOPHIL # BLD AUTO: 0.01 K/UL — SIGNIFICANT CHANGE UP (ref 0–0.5)
EOSINOPHIL NFR BLD AUTO: 0.1 % — SIGNIFICANT CHANGE UP (ref 0–6)
EOSINOPHIL NFR BLD AUTO: 0.1 % — SIGNIFICANT CHANGE UP (ref 0–6)
GLUCOSE SERPL-MCNC: 91 MG/DL — SIGNIFICANT CHANGE UP (ref 70–99)
GLUCOSE SERPL-MCNC: 91 MG/DL — SIGNIFICANT CHANGE UP (ref 70–99)
HCT VFR BLD CALC: 49.9 % — SIGNIFICANT CHANGE UP (ref 39–50)
HCT VFR BLD CALC: 49.9 % — SIGNIFICANT CHANGE UP (ref 39–50)
HGB BLD-MCNC: 17.6 G/DL — HIGH (ref 13–17)
HGB BLD-MCNC: 17.6 G/DL — HIGH (ref 13–17)
IMM GRANULOCYTES NFR BLD AUTO: 0.3 % — SIGNIFICANT CHANGE UP (ref 0–0.9)
IMM GRANULOCYTES NFR BLD AUTO: 0.3 % — SIGNIFICANT CHANGE UP (ref 0–0.9)
LYMPHOCYTES # BLD AUTO: 1.83 K/UL — SIGNIFICANT CHANGE UP (ref 1–3.3)
LYMPHOCYTES # BLD AUTO: 1.83 K/UL — SIGNIFICANT CHANGE UP (ref 1–3.3)
LYMPHOCYTES # BLD AUTO: 24.3 % — SIGNIFICANT CHANGE UP (ref 13–44)
LYMPHOCYTES # BLD AUTO: 24.3 % — SIGNIFICANT CHANGE UP (ref 13–44)
MCHC RBC-ENTMCNC: 32.9 PG — SIGNIFICANT CHANGE UP (ref 27–34)
MCHC RBC-ENTMCNC: 32.9 PG — SIGNIFICANT CHANGE UP (ref 27–34)
MCHC RBC-ENTMCNC: 35.3 GM/DL — SIGNIFICANT CHANGE UP (ref 32–36)
MCHC RBC-ENTMCNC: 35.3 GM/DL — SIGNIFICANT CHANGE UP (ref 32–36)
MCV RBC AUTO: 93.3 FL — SIGNIFICANT CHANGE UP (ref 80–100)
MCV RBC AUTO: 93.3 FL — SIGNIFICANT CHANGE UP (ref 80–100)
MONOCYTES # BLD AUTO: 0.34 K/UL — SIGNIFICANT CHANGE UP (ref 0–0.9)
MONOCYTES # BLD AUTO: 0.34 K/UL — SIGNIFICANT CHANGE UP (ref 0–0.9)
MONOCYTES NFR BLD AUTO: 4.5 % — SIGNIFICANT CHANGE UP (ref 2–14)
MONOCYTES NFR BLD AUTO: 4.5 % — SIGNIFICANT CHANGE UP (ref 2–14)
NEUTROPHILS # BLD AUTO: 5.25 K/UL — SIGNIFICANT CHANGE UP (ref 1.8–7.4)
NEUTROPHILS # BLD AUTO: 5.25 K/UL — SIGNIFICANT CHANGE UP (ref 1.8–7.4)
NEUTROPHILS NFR BLD AUTO: 69.7 % — SIGNIFICANT CHANGE UP (ref 43–77)
NEUTROPHILS NFR BLD AUTO: 69.7 % — SIGNIFICANT CHANGE UP (ref 43–77)
PLATELET # BLD AUTO: 194 K/UL — SIGNIFICANT CHANGE UP (ref 150–400)
PLATELET # BLD AUTO: 194 K/UL — SIGNIFICANT CHANGE UP (ref 150–400)
POTASSIUM SERPL-MCNC: 3.8 MMOL/L — SIGNIFICANT CHANGE UP (ref 3.5–5.3)
POTASSIUM SERPL-MCNC: 3.8 MMOL/L — SIGNIFICANT CHANGE UP (ref 3.5–5.3)
POTASSIUM SERPL-SCNC: 3.8 MMOL/L — SIGNIFICANT CHANGE UP (ref 3.5–5.3)
POTASSIUM SERPL-SCNC: 3.8 MMOL/L — SIGNIFICANT CHANGE UP (ref 3.5–5.3)
PROT SERPL-MCNC: 7.9 GM/DL — SIGNIFICANT CHANGE UP (ref 6–8.3)
PROT SERPL-MCNC: 7.9 GM/DL — SIGNIFICANT CHANGE UP (ref 6–8.3)
RBC # BLD: 5.35 M/UL — SIGNIFICANT CHANGE UP (ref 4.2–5.8)
RBC # BLD: 5.35 M/UL — SIGNIFICANT CHANGE UP (ref 4.2–5.8)
RBC # FLD: 13.6 % — SIGNIFICANT CHANGE UP (ref 10.3–14.5)
RBC # FLD: 13.6 % — SIGNIFICANT CHANGE UP (ref 10.3–14.5)
SODIUM SERPL-SCNC: 142 MMOL/L — SIGNIFICANT CHANGE UP (ref 135–145)
SODIUM SERPL-SCNC: 142 MMOL/L — SIGNIFICANT CHANGE UP (ref 135–145)
WBC # BLD: 7.53 K/UL — SIGNIFICANT CHANGE UP (ref 3.8–10.5)
WBC # BLD: 7.53 K/UL — SIGNIFICANT CHANGE UP (ref 3.8–10.5)
WBC # FLD AUTO: 7.53 K/UL — SIGNIFICANT CHANGE UP (ref 3.8–10.5)
WBC # FLD AUTO: 7.53 K/UL — SIGNIFICANT CHANGE UP (ref 3.8–10.5)

## 2023-10-18 PROCEDURE — 71250 CT THORAX DX C-: CPT | Mod: MG

## 2023-10-18 PROCEDURE — 99284 EMERGENCY DEPT VISIT MOD MDM: CPT | Mod: FS

## 2023-10-18 PROCEDURE — 80053 COMPREHEN METABOLIC PANEL: CPT

## 2023-10-18 PROCEDURE — 85025 COMPLETE CBC W/AUTO DIFF WBC: CPT

## 2023-10-18 PROCEDURE — 36415 COLL VENOUS BLD VENIPUNCTURE: CPT

## 2023-10-18 PROCEDURE — G1004: CPT

## 2023-10-18 PROCEDURE — 71250 CT THORAX DX C-: CPT | Mod: 26,MG

## 2023-10-18 PROCEDURE — 99284 EMERGENCY DEPT VISIT MOD MDM: CPT | Mod: 25

## 2023-10-18 RX ORDER — SODIUM CHLORIDE 9 MG/ML
1000 INJECTION INTRAMUSCULAR; INTRAVENOUS; SUBCUTANEOUS ONCE
Refills: 0 | Status: DISCONTINUED | OUTPATIENT
Start: 2023-10-18 | End: 2023-10-18

## 2023-10-18 NOTE — ED ADULT TRIAGE NOTE - DIRECT TO ROOM CARE INITIATED:
-- DO NOT REPLY / DO NOT REPLY ALL --  -- Message is from the [facility]--    Patient has called to reschedule their upcoming appointment.  Please contact patient as needed.    Existing appointment date / time: September 12:15PM  - Please add mother as ambulatory verbal comm    Provider: DR EVONNE JONES    Patient reschedule preference:  PM     No preference    Caller Information       Type Contact Phone    08/31/2021 12:34 PM CDT Phone (Incoming) ARNULFO BURRELL (Mother) [phone]          Patient requests callback: Yes   Callback phone number: [phone]       Turnaround time given to caller:   \"This message will be sent to [state Provider's name]. The clinical team will fulfill your request as soon as they review your message.\"   18-Oct-2023 18:41

## 2023-10-18 NOTE — ED STATDOCS - PATIENT PORTAL LINK FT
You can access the FollowMyHealth Patient Portal offered by [facility] by registering at the following website: [email or web address]. By joining [facility]’s FollowMyHealth portal, you will also be able to view your health information using other applications (apps) compatible with our system.

## 2023-10-18 NOTE — ED ADULT NURSE NOTE - OBJECTIVE STATEMENT
pt presents to the ED for lower esophageal obstruction following a choking episode. Father states that 24 hours ago, pt got food caught in his esophagus after eating too quickly and he has been trying to cough it up for the past 24 hours unsuccessfully. Pt with hx of food bolus obstruction in the past resulting in abscess. pt has seen multiple doctors for this issue and has not had it successfully resolved. pt is showing no signs of distress and is breathing without difficulty. no further complaints or discomforts reported at this time.

## 2023-10-18 NOTE — ED ADULT NURSE NOTE - CHIEF COMPLAINT QUOTE
Pt brought to the ED by father for esophageal obstruction. Father states that 24 hours ago, pt got something caught in his esophagus and he has been trying to cough it up for the past 24 hours unsuccessfully. Pt with hx of food bolus obstruction in the past resulting in abscess.

## 2023-10-18 NOTE — ED STATDOCS - ATTENDING APP SHARED VISIT CONTRIBUTION OF CARE
I, Ziyad Dias DO, personally saw the patient with ACP.  I have personally performed a face to face diagnostic evaluation on this patient.  I have reviewed the ACP note and agree with the history, exam, and plan of care, except as noted.   The initial assessment was performed by myself and then the patient was handed off to the ACP. The patient was followed and re-evaluated by the ACP. All labs, imaging and procedures were evaluated and performed by the ACP and I was available for consultation if any questions in the patients care came up.

## 2023-10-18 NOTE — ED ADULT NURSE NOTE - NSFALLUNIVINTERV_ED_ALL_ED
Bed/Stretcher in lowest position, wheels locked, appropriate side rails in place/Call bell, personal items and telephone in reach/Instruct patient to call for assistance before getting out of bed/chair/stretcher/Non-slip footwear applied when patient is off stretcher/[place] to call system/Physically safe environment - no spills, clutter or unnecessary equipment/Purposeful proactive rounding/Room/bathroom lighting operational, light cord in reach

## 2023-10-18 NOTE — ED STATDOCS - OBJECTIVE STATEMENT
29 y/o male with down syndrome presents to the ED c/o esophageal obstruction. Per father, yesterday patient got something caught in esophagus, has been trying to cough it up with no success. Has had a similar obstruction resulting in an abscess in the lungs. Has not been able to eat for the past day.

## 2023-10-18 NOTE — ED STATDOCS - CLINICAL SUMMARY MEDICAL DECISION MAKING FREE TEXT BOX
28-year-old male with Down syndrome presents for foreign body sensation.  Father at bedside states patient may have ingested food that got stuck as patient has a history of food impaction and aspiration pneumonia.  Patient has been vomiting every time he consumes anything by mouth.  Patient appears well, no respiratory distress at present without any stridor.  No foreign body appreciated on external exam.  Will evaluate with blood work, CT chest, GI versus pulm if foreign body found, reassess for dispo.

## 2024-04-30 NOTE — ASU PATIENT PROFILE, ADULT - FALL HARM RISK - RISK INTERVENTIONS
Otc Regimen: Start to use Amlactin/ Cerave / Aveeno OTC Detail Level: Detailed Assistance OOB with selected safe patient handling equipment/Communicate Fall Risk and Risk Factors to all staff, patient, and family/Discuss with provider need for PT consult/Monitor gait and stability/Provide patient with walking aids - walker, cane, crutches/Reinforce activity limits and safety measures with patient and family/Visual Cue: Yellow wristband/Bed in lowest position, wheels locked, appropriate side rails in place/Call bell, personal items and telephone in reach/Instruct patient to call for assistance before getting out of bed or chair/Non-slip footwear when patient is out of bed/[place] to call system/Physically safe environment - no spills, clutter or unnecessary equipment/Purposeful Proactive Rounding/Room/bathroom lighting operational, light cord in reach

## 2024-06-19 NOTE — ASU PATIENT PROFILE, ADULT - REASON FOR ADMISSION, PROFILE
Pre-Procedure: Prior to proceeding the treatment areas were cleaned and all present put on their eye protection. Price (Use Numbers Only, No Special Characters Or $): 140 Post-Care Instructions: I reviewed with the patient in detail post-care instructions. EltaMD laser enzyme gel applied. Patient should avoid sun for a minimum of 2 weeks before and after treatment. Cooling: DCD 30/30 Number Of Prepaid Treatments (Will Not Render If 0): 0 Treatment Number: 2 Spot Size: 8 mm Pulse Duration (Include Units): 6.4 med/Custom Skin type: IV Hair color: Black Texture: Fine Fluence (Will Not Render If 0): 6 Pulse Duration (Include Units): 6.4Jcm2 med/ custom Skin type: IV Hair color: Black  Hair Texture: Fine Tolerated Procedure (Optional): Tolerated Well Fluence (Will Not Render If 0): 20 Were Eye Shields Employed?: No Render Post-Care In The Note: Yes Laser Type: Desire Diode 805nm Pulse Duration (Include Units): 20Jcm2 med/ Custom Skin type: V Hair color: Black Hair Texture: Fine Detail Level: Zone Shaving (Optional): The patient was shaved in the office prior to the procedure Consent: Written consent obtained, risks reviewed including but not limited to crusting, scabbing, blistering, scarring, darker or lighter pigmentary change, paradoxical hair regrowth, incomplete removal of hair and infection. Eye Shield Text: Given the treatment area eye shields were inserted prior to treatment. Pulse Duration (Include Units): 6.4Jcm2 Med/custom Skin type: IV Hair color: Black Texture: Fine dysphagia

## 2024-10-20 ENCOUNTER — EMERGENCY (EMERGENCY)
Facility: HOSPITAL | Age: 29
LOS: 0 days | Discharge: HOME | End: 2024-10-20
Attending: STUDENT IN AN ORGANIZED HEALTH CARE EDUCATION/TRAINING PROGRAM
Payer: MEDICARE

## 2024-10-20 VITALS
DIASTOLIC BLOOD PRESSURE: 99 MMHG | TEMPERATURE: 98 F | OXYGEN SATURATION: 100 % | RESPIRATION RATE: 18 BRPM | HEART RATE: 93 BPM | WEIGHT: 128.53 LBS | SYSTOLIC BLOOD PRESSURE: 112 MMHG

## 2024-10-20 DIAGNOSIS — T18.128A FOOD IN ESOPHAGUS CAUSING OTHER INJURY, INITIAL ENCOUNTER: ICD-10-CM

## 2024-10-20 DIAGNOSIS — R11.10 VOMITING, UNSPECIFIED: ICD-10-CM

## 2024-10-20 DIAGNOSIS — Q90.9 DOWN SYNDROME, UNSPECIFIED: ICD-10-CM

## 2024-10-20 LAB
ALBUMIN SERPL ELPH-MCNC: 4 G/DL — SIGNIFICANT CHANGE UP (ref 3.3–5)
ALP SERPL-CCNC: 82 U/L — SIGNIFICANT CHANGE UP (ref 40–120)
ALT FLD-CCNC: 31 U/L — SIGNIFICANT CHANGE UP (ref 12–78)
ANION GAP SERPL CALC-SCNC: 5 MMOL/L — SIGNIFICANT CHANGE UP (ref 5–17)
AST SERPL-CCNC: 34 U/L — SIGNIFICANT CHANGE UP (ref 15–37)
BASOPHILS # BLD AUTO: 0.11 K/UL — SIGNIFICANT CHANGE UP (ref 0–0.2)
BASOPHILS NFR BLD AUTO: 1.4 % — SIGNIFICANT CHANGE UP (ref 0–2)
BILIRUB SERPL-MCNC: 0.9 MG/DL — SIGNIFICANT CHANGE UP (ref 0.2–1.2)
BUN SERPL-MCNC: 20 MG/DL — SIGNIFICANT CHANGE UP (ref 7–23)
CALCIUM SERPL-MCNC: 9.9 MG/DL — SIGNIFICANT CHANGE UP (ref 8.5–10.1)
CHLORIDE SERPL-SCNC: 107 MMOL/L — SIGNIFICANT CHANGE UP (ref 96–108)
CO2 SERPL-SCNC: 27 MMOL/L — SIGNIFICANT CHANGE UP (ref 22–31)
CREAT SERPL-MCNC: 1.26 MG/DL — SIGNIFICANT CHANGE UP (ref 0.5–1.3)
EGFR: 80 ML/MIN/1.73M2 — SIGNIFICANT CHANGE UP
EOSINOPHIL # BLD AUTO: 0.04 K/UL — SIGNIFICANT CHANGE UP (ref 0–0.5)
EOSINOPHIL NFR BLD AUTO: 0.5 % — SIGNIFICANT CHANGE UP (ref 0–6)
GLUCOSE SERPL-MCNC: 86 MG/DL — SIGNIFICANT CHANGE UP (ref 70–99)
HCT VFR BLD CALC: 49.6 % — SIGNIFICANT CHANGE UP (ref 39–50)
HGB BLD-MCNC: 17.5 G/DL — HIGH (ref 13–17)
IMM GRANULOCYTES NFR BLD AUTO: 0.1 % — SIGNIFICANT CHANGE UP (ref 0–0.9)
LIDOCAIN IGE QN: 30 U/L — SIGNIFICANT CHANGE UP (ref 13–75)
LYMPHOCYTES # BLD AUTO: 2.47 K/UL — SIGNIFICANT CHANGE UP (ref 1–3.3)
LYMPHOCYTES # BLD AUTO: 32.5 % — SIGNIFICANT CHANGE UP (ref 13–44)
MCHC RBC-ENTMCNC: 32.6 PG — SIGNIFICANT CHANGE UP (ref 27–34)
MCHC RBC-ENTMCNC: 35.3 GM/DL — SIGNIFICANT CHANGE UP (ref 32–36)
MCV RBC AUTO: 92.5 FL — SIGNIFICANT CHANGE UP (ref 80–100)
MONOCYTES # BLD AUTO: 0.51 K/UL — SIGNIFICANT CHANGE UP (ref 0–0.9)
MONOCYTES NFR BLD AUTO: 6.7 % — SIGNIFICANT CHANGE UP (ref 2–14)
NEUTROPHILS # BLD AUTO: 4.47 K/UL — SIGNIFICANT CHANGE UP (ref 1.8–7.4)
NEUTROPHILS NFR BLD AUTO: 58.8 % — SIGNIFICANT CHANGE UP (ref 43–77)
PLATELET # BLD AUTO: 217 K/UL — SIGNIFICANT CHANGE UP (ref 150–400)
POTASSIUM SERPL-MCNC: 4.5 MMOL/L — SIGNIFICANT CHANGE UP (ref 3.5–5.3)
POTASSIUM SERPL-SCNC: 4.5 MMOL/L — SIGNIFICANT CHANGE UP (ref 3.5–5.3)
PROT SERPL-MCNC: 8 GM/DL — SIGNIFICANT CHANGE UP (ref 6–8.3)
RBC # BLD: 5.36 M/UL — SIGNIFICANT CHANGE UP (ref 4.2–5.8)
RBC # FLD: 13.6 % — SIGNIFICANT CHANGE UP (ref 10.3–14.5)
SODIUM SERPL-SCNC: 139 MMOL/L — SIGNIFICANT CHANGE UP (ref 135–145)
WBC # BLD: 7.61 K/UL — SIGNIFICANT CHANGE UP (ref 3.8–10.5)
WBC # FLD AUTO: 7.61 K/UL — SIGNIFICANT CHANGE UP (ref 3.8–10.5)

## 2024-10-20 PROCEDURE — 74177 CT ABD & PELVIS W/CONTRAST: CPT | Mod: 26,MC

## 2024-10-20 PROCEDURE — 99285 EMERGENCY DEPT VISIT HI MDM: CPT | Mod: FS

## 2024-10-20 PROCEDURE — 74177 CT ABD & PELVIS W/CONTRAST: CPT | Mod: MC

## 2024-10-20 PROCEDURE — 36415 COLL VENOUS BLD VENIPUNCTURE: CPT

## 2024-10-20 PROCEDURE — 71260 CT THORAX DX C+: CPT | Mod: MC

## 2024-10-20 PROCEDURE — 99284 EMERGENCY DEPT VISIT MOD MDM: CPT | Mod: 25

## 2024-10-20 PROCEDURE — 83690 ASSAY OF LIPASE: CPT

## 2024-10-20 PROCEDURE — 80053 COMPREHEN METABOLIC PANEL: CPT

## 2024-10-20 PROCEDURE — 85025 COMPLETE CBC W/AUTO DIFF WBC: CPT

## 2024-10-20 PROCEDURE — 71260 CT THORAX DX C+: CPT | Mod: 26,MC

## 2024-10-20 PROCEDURE — 96374 THER/PROPH/DIAG INJ IV PUSH: CPT | Mod: XU

## 2024-10-20 RX ORDER — SODIUM CHLORIDE 0.9 % (FLUSH) 0.9 %
1000 SYRINGE (ML) INJECTION ONCE
Refills: 0 | Status: COMPLETED | OUTPATIENT
Start: 2024-10-20 | End: 2024-10-20

## 2024-10-20 RX ORDER — ONDANSETRON HCL/PF 4 MG/2 ML
4 VIAL (ML) INJECTION ONCE
Refills: 0 | Status: COMPLETED | OUTPATIENT
Start: 2024-10-20 | End: 2024-10-20

## 2024-10-20 RX ADMIN — Medication 1000 MILLILITER(S): at 17:00

## 2024-10-20 RX ADMIN — Medication 4 MILLIGRAM(S): at 17:00

## 2024-10-20 NOTE — ED ADULT NURSE NOTE - OBJECTIVE STATEMENT
30 y/o male presents to ED c/o "choking" episodes. Pt's father at bedside states that pt will eat something, he will cough it back up. Pt has had endoscopies done in the past. PMHx down syndrome, pt nonverbal at baseline. Pt acting baseline.

## 2024-10-20 NOTE — ED STATDOCS - CROS ED ALLERGIC IMMUN ALL NEG
[FreeTextEntry1] : Mr Potter was seen initially in 1999 for atrial fibrillation then 2017 for chest pain. His nuclear stress was negative in 2019. \par He has moved to [place], is here for 2 months during the pandemic.\par He had an er visit in january for dyspnea treated with antibiotics.\par He denies dyspnea, palpitations or syncope.\par He  has occasional post prandial burning discomfort. \par  negative...

## 2024-10-20 NOTE — ED ADULT TRIAGE NOTE - CHIEF COMPLAINT QUOTE
Pt presents to er with father for complaints of vomiting, as per father, pt has a history of choking often and has had several endoscopy's, unknown as to why he cannot clear his own secretions, Heimlich maneuver was not performed.  Non=-verbal at baseline with a history od Down Syndrome. Pt is acting baseline at this time, airway patent.

## 2024-10-20 NOTE — ED ADULT NURSE NOTE - NSFALLUNIVINTERV_ED_ALL_ED
Bed/Stretcher in lowest position, wheels locked, appropriate side rails in place/Call bell, personal items and telephone in reach/Instruct patient to call for assistance before getting out of bed/chair/stretcher/Non-slip footwear applied when patient is off stretcher/[place] to call system/Physically safe environment - no spills, clutter or unnecessary equipment/Purposeful proactive rounding/Room/bathroom lighting operational, light cord in reach

## 2024-10-20 NOTE — ED STATDOCS - OBJECTIVE STATEMENT
29 year old male, non-verbal at baseline, with PMHx of down syndrome presenting to ED with father c/o vomiting up every meal since yesterday afternoon s/p eating chicken tenders. As per father, pt has a history of choking episodes often and has had several endoscopies in the past. Father states the cause of pt's symptoms is still unknown. Heimlich maneuver was not performed. Pt has no loss of appetite and is acting baseline at this time. Pt has no known medical allergies. 29 year old male, non-verbal at baseline, with PMHx of down syndrome presenting to ED with father c/o vomiting up every meal since yesterday afternoon s/p eating boneless chicken tenders. As per father, pt has a history of choking episodes often and has had several endoscopies in the past. Father states the cause of pt's symptoms is still unknown. Heimlich maneuver was not performed. Pt has no loss of appetite and is acting baseline at this time. Pt has no known medical allergies. occasionally patient is coughing/gagging which father states happens after episodes of impaction.

## 2024-10-20 NOTE — ED STATDOCS - NSFOLLOWUPINSTRUCTIONS_ED_ALL_ED_FT
Eat small bites  Follow up with your gastroenterologists   Return to the ED if any worsening symptoms     Food Impaction    WHAT YOU NEED TO KNOW:    What is food impaction? Food impaction occurs when food (often meat or fish bones) becomes stuck in your esophagus. Food impaction can occur if your esophagus does not function normally. Food impaction may also happen if you do not have teeth or do not chew your food completely.    How is food impaction diagnosed and treated? Your healthcare provider will ask what your symptoms are, when they began, and what you recently ate. You may need an x-ray to help locate the food in your esophagus. You may need the following treatments:    Medicines may be given to relax your esophagus. This may help food pass into your stomach.    Endoscopy is a procedure in which a scope (thin, flexible tube with a light) is used to examine your upper gastrointestinal tract. Devices, such as forceps, are used during endoscopy to pull out the food. Your healthcare provider may gently push the food through your esophagus and into your stomach. Your provider may send tissue from your esophagus to a lab for tests.  How can I prevent another episode of food impaction?    Sit up while you eat. Raise the head of your bed if you need to eat in bed.    Make sure your dentures fit well. Poorly fitting dentures make it difficult to chew.    Chew your food completely. Proper chewing will help with swallowing.    Drink water with meals. Water will help move food down to your stomach.    Follow up with your healthcare provider. You may need tests to check your ability to swallow.  When should I seek immediate care?    You are unable to swallow your saliva.    Your breathing is noisy.    You have trouble breathing.    You have repeated vomiting or blood in your vomit.    You have pain in your chest or abdomen.  When should I contact my healthcare provider?    You feel you have something stuck in your throat.    You choke or vomit after eating.    You have questions or concerns about your condition or care.  CARE AGREEMENT:    You have the right to help plan your care. Learn about your health condition and how it may be treated. Discuss treatment options with your healthcare providers to decide what care you want to receive. You always have the right to refuse treatment.

## 2024-10-20 NOTE — ED STATDOCS - PROGRESS NOTE DETAILS
Labs drawn, waiting on CT scans. ~Osmar Antonio PA-C PA: Patient is a 29 year old male, non-verbal at baseline, with PMHx of down syndrome who presents to [facility] BIB father c/o vomiting up every meal since yesterday afternoon s/p eating chicken tenders. As per father, pt has a history of choking episodes often and has had several endoscopies in the past. Father states the cause of pt's symptoms is still unknown. ~Osmar Antonio PA-C SAM Lund: Ct reviewed. pt tolerated drinking ginger ale and eating crackers. Pt stable for dc and to follow up with GI. Pt agrees with plan. SAM Lund: pt signed out to me. Plan to follow up Ct results

## 2024-10-20 NOTE — ED STATDOCS - PHYSICAL EXAMINATION
Constitutional: NAD, Pt nonverbal at baseline, pt intermittently gagging but no vomiting  Eyes: EOMI  Head: Normocephalic atraumatic  Mouth: MMM  Cardiac: regular rate   Resp: unlabored breathing  GI: Abd s/nt/nd  Neuro: grossly normal and intact  Skin: No visible rashes Attending: Constitutional: NAD, Pt nonverbal at baseline, pt intermittently gagging but no vomiting  Eyes: EOMI  Head: Normocephalic atraumatic  Mouth: MMM  Cardiac: regular rate   Resp: unlabored breathing  GI: Abd s/nt/nd  Neuro: grossly normal and intact  Skin: No visible rashes Attending: Constitutional: NAD, Pt nonverbal at baseline, pt intermittently gagging/coughing but no vomiting  Eyes: EOMI  Head: Normocephalic atraumatic  Mouth: MMM  Cardiac: regular rate   Resp: unlabored breathing no stridor or drooling  GI: Abd s/nt/nd  Neuro: grossly normal and intact  Skin: No visible rashes

## 2024-10-20 NOTE — ED STATDOCS - CLINICAL SUMMARY MEDICAL DECISION MAKING FREE TEXT BOX
30 y/o M with hx with multiple prior episodes of esophageal impaction with aspiration PNA with lung abscess. Labs, CT Chest abd pelvis to eval for aspiration vs esophageal impaction

## 2024-10-20 NOTE — ED STATDOCS - PATIENT PORTAL LINK FT
You can access the FollowMyHealth Patient Portal offered by [facility] by registering at the following website: [email or web address]. By joining [employer]’s FollowMyHealth portal, you will also be able to view your health information using other applications (apps) compatible with our system.

## 2024-11-20 NOTE — ED ADULT TRIAGE NOTE - SOURCE OF INFORMATION
"Chief Complaint  Rash (X 3 months and worsening. Abdomen, upper legs, forearms. Sores on the back of their head. Itching, discoloration, sometimes painful. Triamcinolone is not helping.) and Headache (X 3 months, four continuous days. OTC tylenol not effective. Located where the sores are.)    Subjective    History of Present Illness    Sabas Garza presents to [facility] for Rash (X 3 months and worsening. Abdomen, upper legs, forearms. Sores on the back of their head. Itching, discoloration, sometimes painful. Triamcinolone is not helping.) and Headache (X 3 months, four continuous days. OTC tylenol not effective. Located where the sores are.).  History of Present Illness     Here today with concern as above. Has an erythematous and pruritic rash that has been spreading across torso and extremities over the past several months. Started with a large lesion in the left lower abdominal/upper pelvic region. Has very sensitive skin at baseline, itching has been quite extreme at times. Triamcinolone helps a little bit with the itch but has not resolved any of the lesions. Has 1 on the back of his scalp that is quite painful.    Has also been experiencing a headache for several days now. Has a fair amount of shoulder and neck pain as well. Has not tried any massage. Headache is around the periphery of the head, no light or sound sensitivity. No nausea or vomiting.    Objective     Vital Signs:   /90   Pulse 72   Resp 18   Ht 170.2 cm (67\")   Wt (!) 155 kg (342 lb)   SpO2 97%   BMI 53.56 kg/m²   Physical Exam  Vitals and nursing note reviewed.   Constitutional:       General: He is not in acute distress.     Appearance: Normal appearance. He is not ill-appearing.   Cardiovascular:      Rate and Rhythm: Normal rate and regular rhythm.      Pulses: Normal pulses.      Heart sounds: Normal heart sounds. No murmur heard.  Musculoskeletal:      Comments: Tense and tender over " bilateral trapezius as well as levator scapula. Pressure here recreates some of the headache pain.   Skin:            Comments: Erythematous scaly spot on the posterior left scalp with some oozing and overlying excoriation. Slightly warm to touch.    Diffuse erythematous papules and plaques around the torso and upper extremities with a very large herald patch in the left lower quadrant.   Neurological:      Mental Status: He is alert and oriented to person, place, and time. Mental status is at baseline.   Psychiatric:         Mood and Affect: Mood normal.         Behavior: Behavior normal.                 Assessment and Plan   Diagnoses and all orders for this visit:    1. Cellulitis of head except face (Primary)  -     mupirocin (BACTROBAN) 2 % ointment; Apply 1 Application topically to the appropriate area as directed 3 (Three) Times a Day.  Dispense: 30 g; Refill: 0    2. Pityriasis rosea    3. Tension headache  -     cyclobenzaprine (FLEXERIL) 5 MG tablet; Take 1 tablet by mouth 3 (Three) Times a Day As Needed for Muscle Spasms.  Dispense: 30 tablet; Refill: 2    Orders as above. Rash appears to be pityriasis rosea. Discussed natural history and prognosis at length. Will try mupirocin for what appears to be an infected area on the back of the head. Anticipate resolution with time.    Will try some cyclobenzaprine as well as massage and NSAIDs for what is likely tension headache. Should resolve with time. He will keep me updated with his progress.    Recommended follow up as below. Encouraged communication via MyChart in the meantime.     Patient was given instructions and counseling regarding his condition or for health maintenance advice. Please see specific information pulled into the AVS (placed there by myself) if appropriate.    Return if symptoms worsen or fail to improve.    AARON Barber MD         Patient

## 2024-11-22 ENCOUNTER — APPOINTMENT (OUTPATIENT)
Dept: OTOLARYNGOLOGY | Facility: CLINIC | Age: 29
End: 2024-11-22
Payer: MEDICARE

## 2024-11-22 PROCEDURE — 92610 EVALUATE SWALLOWING FUNCTION: CPT | Mod: GN

## 2024-11-27 ENCOUNTER — APPOINTMENT (OUTPATIENT)
Dept: GASTROENTEROLOGY | Facility: CLINIC | Age: 29
End: 2024-11-27
Payer: MEDICARE

## 2024-11-27 VITALS — HEART RATE: 82 BPM | OXYGEN SATURATION: 99 % | WEIGHT: 128 LBS | BODY MASS INDEX: 25.13 KG/M2 | HEIGHT: 60 IN

## 2024-11-27 DIAGNOSIS — K20.80 OTHER ESOPHAGITIS WITHOUT BLEEDING: ICD-10-CM

## 2024-11-27 DIAGNOSIS — R13.10 DYSPHAGIA, UNSPECIFIED: ICD-10-CM

## 2024-11-27 PROCEDURE — 99214 OFFICE O/P EST MOD 30 MIN: CPT

## 2024-12-02 PROBLEM — K20.80 LOS ANGELES GRADE C ESOPHAGITIS: Status: ACTIVE | Noted: 2024-12-02

## 2024-12-04 ENCOUNTER — APPOINTMENT (OUTPATIENT)
Dept: OTOLARYNGOLOGY | Facility: CLINIC | Age: 29
End: 2024-12-04

## 2024-12-10 ENCOUNTER — OUTPATIENT (OUTPATIENT)
Dept: OUTPATIENT SERVICES | Facility: HOSPITAL | Age: 29
LOS: 1 days | End: 2024-12-10
Payer: MEDICARE

## 2024-12-10 DIAGNOSIS — R13.10 DYSPHAGIA, UNSPECIFIED: ICD-10-CM

## 2024-12-10 PROCEDURE — 74220 X-RAY XM ESOPHAGUS 1CNTRST: CPT | Mod: 26

## 2024-12-10 PROCEDURE — 74220 X-RAY XM ESOPHAGUS 1CNTRST: CPT

## 2024-12-11 ENCOUNTER — NON-APPOINTMENT (OUTPATIENT)
Age: 29
End: 2024-12-11

## 2024-12-11 ENCOUNTER — APPOINTMENT (OUTPATIENT)
Dept: OTOLARYNGOLOGY | Facility: CLINIC | Age: 29
End: 2024-12-11

## 2024-12-11 DIAGNOSIS — R13.10 DYSPHAGIA, UNSPECIFIED: ICD-10-CM

## 2024-12-18 ENCOUNTER — APPOINTMENT (OUTPATIENT)
Dept: OTOLARYNGOLOGY | Facility: CLINIC | Age: 29
End: 2024-12-18

## 2025-01-08 ENCOUNTER — APPOINTMENT (OUTPATIENT)
Dept: OTOLARYNGOLOGY | Facility: CLINIC | Age: 30
End: 2025-01-08

## 2025-02-13 ENCOUNTER — TRANSCRIPTION ENCOUNTER (OUTPATIENT)
Age: 30
End: 2025-02-13

## 2025-02-19 ENCOUNTER — TRANSCRIPTION ENCOUNTER (OUTPATIENT)
Age: 30
End: 2025-02-19

## 2025-02-19 ENCOUNTER — APPOINTMENT (OUTPATIENT)
Dept: GASTROENTEROLOGY | Facility: HOSPITAL | Age: 30
End: 2025-02-19

## 2025-02-19 ENCOUNTER — RESULT REVIEW (OUTPATIENT)
Age: 30
End: 2025-02-19

## 2025-02-19 ENCOUNTER — OUTPATIENT (OUTPATIENT)
Dept: OUTPATIENT SERVICES | Facility: HOSPITAL | Age: 30
LOS: 1 days | End: 2025-02-19
Payer: MEDICARE

## 2025-02-19 VITALS
WEIGHT: 134.92 LBS | TEMPERATURE: 98 F | HEART RATE: 81 BPM | HEIGHT: 60 IN | RESPIRATION RATE: 20 BRPM | DIASTOLIC BLOOD PRESSURE: 81 MMHG | SYSTOLIC BLOOD PRESSURE: 112 MMHG | OXYGEN SATURATION: 100 %

## 2025-02-19 VITALS
HEART RATE: 66 BPM | SYSTOLIC BLOOD PRESSURE: 112 MMHG | DIASTOLIC BLOOD PRESSURE: 70 MMHG | RESPIRATION RATE: 18 BRPM | OXYGEN SATURATION: 98 %

## 2025-02-19 DIAGNOSIS — K22.10 ULCER OF ESOPHAGUS W/OUT BLEEDING: ICD-10-CM

## 2025-02-19 DIAGNOSIS — R13.10 DYSPHAGIA, UNSPECIFIED: ICD-10-CM

## 2025-02-19 PROCEDURE — 88305 TISSUE EXAM BY PATHOLOGIST: CPT

## 2025-02-19 PROCEDURE — 43239 EGD BIOPSY SINGLE/MULTIPLE: CPT

## 2025-02-19 PROCEDURE — 88342 IMHCHEM/IMCYTCHM 1ST ANTB: CPT

## 2025-02-19 PROCEDURE — 88342 IMHCHEM/IMCYTCHM 1ST ANTB: CPT | Mod: 26

## 2025-02-19 PROCEDURE — 88305 TISSUE EXAM BY PATHOLOGIST: CPT | Mod: 26

## 2025-02-19 DEVICE — NET RETRV ROT ROTH 2.5MMX230CM: Type: IMPLANTABLE DEVICE | Status: FUNCTIONAL

## 2025-02-19 DEVICE — KIT CVC 2LUM MAC 9FR CHG: Type: IMPLANTABLE DEVICE | Status: FUNCTIONAL

## 2025-02-19 DEVICE — CATH THERMODIL PACE 7.5FR: Type: IMPLANTABLE DEVICE | Status: FUNCTIONAL

## 2025-02-19 DEVICE — KIT A-LINE 1LUM 20G X 12CM SAFE KIT: Type: IMPLANTABLE DEVICE | Status: FUNCTIONAL

## 2025-02-19 RX ADMIN — Medication 30 MILLILITER(S): at 11:44

## 2025-02-19 NOTE — PRE-ANESTHESIA EVALUATION ADULT - NSANTHOSAYNRD_GEN_A_CORE
No. JAYSHREE screening performed.  STOP BANG Legend: 0-2 = LOW Risk; 3-4 = INTERMEDIATE Risk; 5-8 = HIGH Risk

## 2025-02-19 NOTE — ASU PATIENT PROFILE, ADULT - FALL HARM RISK - PATIENT NEEDS ASSISTANCE
Information: This plan will allow you to select a body location and will not render the procedure on the note output. It will note the location you select in the morphology.
Detail Level: Simple
No assistance needed

## 2025-02-19 NOTE — PRE PROCEDURE NOTE - PRE PROCEDURE EVALUATION
Attending Physician:              José Miguel Morales MD MSEd    Indication for Procedure    Dysphagia,             PAST MEDICAL & SURGICAL HISTORY:  Down syndrome      Autism      No significant past surgical history            See Allscripts Note for further details  ALLERGIES:  No Known Allergies    HOME MEDICATIONS:      See Allscripts Note for further details    AICD/PPM: [ ] yes   [ x] no    PERTINENT LAB DATA:                      PHYSICAL EXAMINATION:    Height (cm): 147.3  Weight (kg): 61.2  BMI (kg/m2): 28.2  BSA (m2): 1.54T(C): --  HR: --  BP: --  RR: --  SpO2: --    Constitutional: NAD  HEENT: PERRLA, EOMI,    Neck:  No JVD  Respiratory: CTAB/L  Cardiovascular: S1 and S2  Gastrointestinal: BS+, soft, NT/ND  Extremities: No peripheral edema  Neurological: A/O x 3, no focal deficits  Psychiatric: Normal mood, normal affect  Skin: No rashes    ASA Class: I [ ]  II [x ]  III [ ]  IV [ ]    COMMENTS:    The patient is a suitable candidate for the planned procedure unless box checked [ ]  No, explain:

## 2025-02-19 NOTE — ASU PREOP CHECKLIST - NSWEIGHTCALCTOOLDRUG_GEN_A_CORE
-- DO NOT REPLY / DO NOT REPLY ALL --  -- Message is from the [facility]--    COVID-19 Universal Screening: Positive    General Patient Message      Reason for Call: Patient tested positive 11/09/2020. Patient is calling to establish care with provider and also be seen for a return to work evaluation. There are no virtual appointments available. Please contact at earliest convenience with further details.    Caller Information       Type Contact Phone    11/16/2020 09:04 AM CST Phone (Incoming) Gabriele Lamb (Self) [phone] (M)          Alternative phone number: None    Turnaround time given to caller:   \"This message will be sent to [state Provider's name]. The clinical team will fulfill your request as soon as they review your message.\"      used

## 2025-02-19 NOTE — ASU DISCHARGE PLAN (ADULT/PEDIATRIC) - FINANCIAL ASSISTANCE
[facility] provides services at a reduced cost to those who are determined to be eligible through [facility]’s financial assistance program. Information regarding [facility]’s financial assistance program can be found by going to [email or web address] or by calling [phone].

## 2025-02-19 NOTE — ASU DISCHARGE PLAN (ADULT/PEDIATRIC) - NSDISCH_INCISION_ENDO_ALL_CORE_FT
PT no longer has this phone number If an incision was performed as part of your procedure, contact your doctor with any pain, swelling, redness, or other concerns you may have about that area.

## 2025-02-19 NOTE — ASU PATIENT PROFILE, ADULT - FALL HARM RISK - HARM RISK INTERVENTIONS

## 2025-02-24 LAB — SURGICAL PATHOLOGY STUDY: SIGNIFICANT CHANGE UP

## 2025-02-28 RX ORDER — ESOMEPRAZOLE MAGNESIUM 40 MG/1
40 FOR SUSPENSION ORAL
Qty: 60 | Refills: 3 | Status: ACTIVE | COMMUNITY
Start: 2025-02-19

## 2025-03-04 DIAGNOSIS — K22.10 ULCER OF ESOPHAGUS W/OUT BLEEDING: ICD-10-CM

## 2025-05-28 ENCOUNTER — APPOINTMENT (OUTPATIENT)
Dept: GASTROENTEROLOGY | Facility: CLINIC | Age: 30
End: 2025-05-28
Payer: MEDICARE

## 2025-05-28 VITALS
OXYGEN SATURATION: 97 % | WEIGHT: 128 LBS | HEIGHT: 60 IN | RESPIRATION RATE: 16 BRPM | DIASTOLIC BLOOD PRESSURE: 60 MMHG | HEART RATE: 76 BPM | BODY MASS INDEX: 25.13 KG/M2 | SYSTOLIC BLOOD PRESSURE: 100 MMHG

## 2025-05-28 DIAGNOSIS — K22.10 ULCER OF ESOPHAGUS W/OUT BLEEDING: ICD-10-CM

## 2025-05-28 PROBLEM — F84.0 AUTISTIC DISORDER: Chronic | Status: ACTIVE | Noted: 2025-02-19

## 2025-05-28 PROCEDURE — 99214 OFFICE O/P EST MOD 30 MIN: CPT

## 2025-06-06 ENCOUNTER — TRANSCRIPTION ENCOUNTER (OUTPATIENT)
Age: 30
End: 2025-06-06

## 2025-06-06 ENCOUNTER — RESULT REVIEW (OUTPATIENT)
Age: 30
End: 2025-06-06

## 2025-06-06 ENCOUNTER — OUTPATIENT (OUTPATIENT)
Dept: OUTPATIENT SERVICES | Facility: HOSPITAL | Age: 30
LOS: 1 days | End: 2025-06-06
Payer: MEDICARE

## 2025-06-06 ENCOUNTER — APPOINTMENT (OUTPATIENT)
Dept: GASTROENTEROLOGY | Facility: HOSPITAL | Age: 30
End: 2025-06-06

## 2025-06-06 VITALS
SYSTOLIC BLOOD PRESSURE: 124 MMHG | WEIGHT: 128.09 LBS | OXYGEN SATURATION: 97 % | HEIGHT: 60 IN | HEART RATE: 76 BPM | RESPIRATION RATE: 13 BRPM | TEMPERATURE: 98 F | DIASTOLIC BLOOD PRESSURE: 82 MMHG

## 2025-06-06 VITALS
HEART RATE: 70 BPM | DIASTOLIC BLOOD PRESSURE: 67 MMHG | RESPIRATION RATE: 20 BRPM | OXYGEN SATURATION: 95 % | SYSTOLIC BLOOD PRESSURE: 115 MMHG

## 2025-06-06 PROCEDURE — 88305 TISSUE EXAM BY PATHOLOGIST: CPT

## 2025-06-06 PROCEDURE — 43239 EGD BIOPSY SINGLE/MULTIPLE: CPT

## 2025-06-06 PROCEDURE — 88305 TISSUE EXAM BY PATHOLOGIST: CPT | Mod: 26

## 2025-06-06 RX ORDER — OMEPRAZOLE 20 MG/1
1 CAPSULE, DELAYED RELEASE ORAL
Refills: 0 | DISCHARGE

## 2025-06-06 RX ADMIN — Medication 30 MILLILITER(S): at 11:14

## 2025-06-06 NOTE — PRE PROCEDURE NOTE - PRE PROCEDURE EVALUATION
Attending Physician:           Lillie Ayala          Procedure: upper endoscopy    Indication for Procedure: history of LA C esophagitis, GERD  ________________________________________________________  PAST MEDICAL & SURGICAL HISTORY:  Down syndrome      Autism      No significant past surgical history        ALLERGIES:  No Known Allergies    HOME MEDICATIONS:  omeprazole 10 mg oral powder for reconstitution, delayed release: 1 packet(s) orally 2 times a day    AICD/PPM: [ ] yes   [ ] no    PERTINENT LAB DATA:                      PHYSICAL EXAMINATION:    Height (cm): 147.3  Weight (kg): 58.1  BMI (kg/m2): 26.8  BSA (m2): 1.51T(C): 36.7  HR: 76  BP: 124/82  RR: 13  SpO2: 97%    Constitutional: NAD  HEENT: PERRLA, EOMI,    Neck:  No JVD  Respiratory: CTAB/L  Cardiovascular: S1 and S2  Gastrointestinal: BS+, soft, NT/ND  Extremities: No peripheral edema  Neurological: A/O x 3, no focal deficits  Psychiatric: Normal mood, normal affect  Skin: No rashes    ASA Class: I [ ]  II [ ]  III [ ]  IV [ ]    COMMENTS:    The patient is a suitable candidate for the planned procedure unless box checked [ ]  No, explain:

## 2025-06-06 NOTE — ASU DISCHARGE PLAN (ADULT/PEDIATRIC) - FINANCIAL ASSISTANCE
[facility] provides services at a reduced cost to those who are determined to be eligible through [facility]’s financial assistance program. Information regarding [facility]’s financial assistance program can be found by going to [email or web address] or by calling [phone].

## 2025-06-06 NOTE — ASU DISCHARGE PLAN (ADULT/PEDIATRIC) - NS MD DC FALL RISK RISK
For information on Fall & Injury Prevention, visit: [email or web address] OR  [email or web address] OR  https://www.cdc.gov/steadi/patient.html

## 2025-06-06 NOTE — ASU PATIENT PROFILE, ADULT - FALL HARM RISK - UNIVERSAL INTERVENTIONS
Bed in lowest position, wheels locked, appropriate side rails in place/Call bell, personal items and telephone in reach/Instruct patient to call for assistance before getting out of bed or chair/Non-slip footwear when patient is out of bed/[place] to call system/Physically safe environment - no spills, clutter or unnecessary equipment/Purposeful Proactive Rounding/Room/bathroom lighting operational, light cord in reach

## 2025-06-10 LAB — SURGICAL PATHOLOGY STUDY: SIGNIFICANT CHANGE UP

## 2025-06-12 ENCOUNTER — TRANSCRIPTION ENCOUNTER (OUTPATIENT)
Age: 30
End: 2025-06-12

## 2025-06-22 NOTE — ED STATDOCS - GASTROINTESTINAL, MLM
I will SWITCH the dose or number of times a day I take the medications listed below when I get home from the hospital:  None abdomen soft, non-tender, and non-distended. Bowel sounds present.

## 2025-08-05 ENCOUNTER — RX RENEWAL (OUTPATIENT)
Age: 30
End: 2025-08-05

## (undated) DEVICE — GUN DIL ALLIANCE

## (undated) DEVICE — BIOPSY FORCEP COLD DISP

## (undated) DEVICE — LINE BREATHE SAMPLNG

## (undated) DEVICE — CONTAINER FORMALIN 80ML YELLOW

## (undated) DEVICE — PACK IV START WITH CHG

## (undated) DEVICE — GOWN LG

## (undated) DEVICE — BITE BLOCK ADULT 20 X 27MM (GREEN)

## (undated) DEVICE — BALLOON US ENDO

## (undated) DEVICE — CONTAINER FORMALIN 10% 20ML

## (undated) DEVICE — SUCTION YANKAUER NO CONTROL VENT

## (undated) DEVICE — IRRIGATOR BIO SHIELD

## (undated) DEVICE — DENTURE CUP PINK

## (undated) DEVICE — TUBING MEDI-VAC W MAXIGRIP CONNECTORS 1/4"X6'

## (undated) DEVICE — DRSG CURITY GAUZE SPONGE 4 X 4" 12-PLY NON-STERILE

## (undated) DEVICE — TUBING IV SET GRAVITY 3Y 100" MACRO

## (undated) DEVICE — CATH IV SAFE BC 20G X 1.16" (PINK)

## (undated) DEVICE — BIOPSY FORCEP RADIAL JAW 4 STANDARD WITH NEEDLE

## (undated) DEVICE — BASIN EMESIS 10IN GRADUATED MAUVE

## (undated) DEVICE — LUBRICATING JELLY HR ONE SHOT 3G

## (undated) DEVICE — SYR ALLIANCE II INFLATION 60ML

## (undated) DEVICE — UNDERPAD LINEN SAVER 17 X 24"

## (undated) DEVICE — SOL INJ NS 0.9% 500ML 2 PORT

## (undated) DEVICE — SENSOR O2 FINGER ADULT

## (undated) DEVICE — TUBING SUCTION CONN 6FT STERILE

## (undated) DEVICE — ELCTR ECG CONDUCTIVE ADHESIVE

## (undated) DEVICE — CATH IV SAFE BC 22G X 1" (BLUE)

## (undated) DEVICE — FOLEY HOLDER STATLOCK 2 WAY ADULT

## (undated) DEVICE — CLAMP BX HOT RAD JAW 3

## (undated) DEVICE — DRSG 2X2

## (undated) DEVICE — TUBING SUCTION 20FT

## (undated) DEVICE — SYR LUER LOK 50CC

## (undated) DEVICE — DRSG BANDAID 0.75X3"

## (undated) DEVICE — FORCEP RADIAL JAW 4 JUMBO 2.8MM 3.2MM 240CM ORANGE DISP

## (undated) DEVICE — BRUSH COLONOSCOPY CYTOLOGY

## (undated) DEVICE — FACESHIELD FULL VISOR

## (undated) DEVICE — SALIVA EJECTOR (BLUE)